# Patient Record
Sex: MALE | Race: WHITE
[De-identification: names, ages, dates, MRNs, and addresses within clinical notes are randomized per-mention and may not be internally consistent; named-entity substitution may affect disease eponyms.]

---

## 2017-07-11 ENCOUNTER — HOSPITAL ENCOUNTER (OUTPATIENT)
Dept: HOSPITAL 47 - ORWHC2ENDO | Age: 57
Discharge: HOME | End: 2017-07-11
Attending: SURGERY
Payer: COMMERCIAL

## 2017-07-11 VITALS — HEART RATE: 71 BPM | DIASTOLIC BLOOD PRESSURE: 57 MMHG | SYSTOLIC BLOOD PRESSURE: 114 MMHG | RESPIRATION RATE: 18 BRPM

## 2017-07-11 VITALS — BODY MASS INDEX: 31.1 KG/M2

## 2017-07-11 VITALS — TEMPERATURE: 97.5 F

## 2017-07-11 DIAGNOSIS — Z79.899: ICD-10-CM

## 2017-07-11 DIAGNOSIS — E11.9: ICD-10-CM

## 2017-07-11 DIAGNOSIS — I10: ICD-10-CM

## 2017-07-11 DIAGNOSIS — Z95.1: ICD-10-CM

## 2017-07-11 DIAGNOSIS — E78.5: ICD-10-CM

## 2017-07-11 DIAGNOSIS — Z12.11: Primary | ICD-10-CM

## 2017-07-11 DIAGNOSIS — Z79.4: ICD-10-CM

## 2017-07-11 DIAGNOSIS — K21.9: ICD-10-CM

## 2017-07-11 DIAGNOSIS — Z87.891: ICD-10-CM

## 2017-07-11 DIAGNOSIS — Z79.82: ICD-10-CM

## 2017-07-11 DIAGNOSIS — Z79.84: ICD-10-CM

## 2017-07-11 DIAGNOSIS — Z95.810: ICD-10-CM

## 2017-07-11 LAB
GLUCOSE BLD-MCNC: 113 MG/DL (ref 75–99)
GLUCOSE BLD-MCNC: 122 MG/DL (ref 75–99)

## 2017-07-11 NOTE — P.GSHP
History of Present Illness


H&P Date: 07/11/17


Chief Complaint: Screening colonoscopy





Assistant 56-year-old male referred from Dr. Luong.  Patient presents today 

for screening colonoscopy.





Past Medical History


Past Medical History: Diabetes Mellitus, GERD/Reflux, Hyperlipidemia, 

Hypertension


Additional Past Medical History / Comment(s): SEE DR CARSON'S H&P


History of Any Multi-Drug Resistant Organisms: None Reported


Past Surgical History: AICD, Appendectomy, Back Surgery, Coronary Bypass/CABG, 

Heart Catheterization


Additional Past Surgical History / Comment(s): CABG X5,.  LEFT EYE RETINAL SX 

X2.  RT CATARACT SX


Past Anesthesia/Blood Transfusion Reactions: Motion Sickness, Postoperative 

Nausea & Vomiting (PONV)


Type of Cardiac Device: AICD


Device Placement Date:: NOT SURE


Smoking Status: Former smoker





- Past Family History


  ** Father


Family Medical History: Cancer


Additional Family Medical History / Comment(s): PROSTATE





Medications and Allergies


 Home Medications











 Medication  Instructions  Recorded  Confirmed  Type


 


Aspirin [Adult Low Dose Aspirin EC] 81 mg PO QAM 05/31/16 07/10/17 History


 


Insulin Glargine [Lantus] 24 unit SQ  05/31/16 07/10/17 History


 


Metoprolol Tartrate [Lopressor] 50 mg PO BID 05/31/16 07/10/17 History


 


Omeprazole 20 mg PO QAM 05/31/16 07/10/17 History


 


prednisoLONE ACETATE 1% OPHTH 1 drop RIGHT EYE BID 05/31/16 07/10/17 History





[Pred Forte 1%]    


 


sitaGLIPtin [Januvia] 100 mg PO QAM 05/31/16 07/10/17 History


 


Atorvastatin [Lipitor] 80 mg PO  07/10/17 07/10/17 History


 


Losartan Potassium [Cozaar] 100 mg PO DAILY 07/10/17 07/10/17 History


 


metFORMIN HCL 1,000 mg PO BID 07/10/17 07/10/17 History











 Allergies











Allergy/AdvReac Type Severity Reaction Status Date / Time


 


No Known Allergies Allergy   Verified 07/10/17 08:15














Surgical - Exam


 Vital Signs











Pulse Resp BP Pulse Ox


 


 78   18   153/85   98 


 


 07/11/17 07:15  07/11/17 07:15  07/11/17 07:15  07/11/17 07:15














- General


well developed, no distress





- Eyes


PERRL





- Respiratory


normal expansion





- Cardiovascular


Rhythm: regular





- Abdomen


Abdomen: soft, non tender





Results





- Labs


 Abnormal Lab Results - Last 24 Hours (Table)











  07/11/17 Range/Units





  07:26 


 


POC Glucose (mg/dL)  122 H  (75-99)  mg/dL














Assessment and Plan


Plan: 





We'll perform screening colonoscopy.

## 2017-07-11 NOTE — P.OP
Date of Procedure: 07/11/17


Preoperative Diagnosis: 


Screening colonoscopy


Postoperative Diagnosis: 


Normal colonoscopy


Procedure(s) Performed: 


Colonoscopy


Implants: 





Anesthesia: MAC


Surgeon: Yadiel Argueta


Pathology: none sent


Condition: stable


Disposition: PACU


Indications for Procedure: 





Operative Findings: 





Description of Procedure: 





PROCEDURE:  The patient was placed on the endoscopy table in the lateral 

position.  Digital rectal examination was performed which revealed no 

abnormalities.  The prostate was symmetrical without nodules.  Flexible 

colonoscope was then placed in the patient's anus and passed throughout the 

entire colon.  The ileocecal valve was visualized.  The cecum, ascending, 

transverse, descending and sigmoid colon were normal.  The rectum was normal as 

well.  There were no masses, polyps or diverticula noted in the entire colon. 





SUMMARY OF FINDINGS:  


Normal colonoscopy.

## 2018-08-21 ENCOUNTER — HOSPITAL ENCOUNTER (EMERGENCY)
Dept: HOSPITAL 47 - EC | Age: 58
Discharge: HOME | End: 2018-08-21
Payer: COMMERCIAL

## 2018-08-21 VITALS — TEMPERATURE: 98.6 F | RESPIRATION RATE: 18 BRPM

## 2018-08-21 VITALS — DIASTOLIC BLOOD PRESSURE: 79 MMHG | SYSTOLIC BLOOD PRESSURE: 136 MMHG | HEART RATE: 80 BPM

## 2018-08-21 DIAGNOSIS — E78.5: ICD-10-CM

## 2018-08-21 DIAGNOSIS — K21.9: ICD-10-CM

## 2018-08-21 DIAGNOSIS — Z90.49: ICD-10-CM

## 2018-08-21 DIAGNOSIS — Z79.899: ICD-10-CM

## 2018-08-21 DIAGNOSIS — Z87.891: ICD-10-CM

## 2018-08-21 DIAGNOSIS — E86.0: ICD-10-CM

## 2018-08-21 DIAGNOSIS — D72.829: ICD-10-CM

## 2018-08-21 DIAGNOSIS — I10: ICD-10-CM

## 2018-08-21 DIAGNOSIS — Z79.52: ICD-10-CM

## 2018-08-21 DIAGNOSIS — Z79.82: ICD-10-CM

## 2018-08-21 DIAGNOSIS — R19.7: ICD-10-CM

## 2018-08-21 DIAGNOSIS — I25.10: ICD-10-CM

## 2018-08-21 DIAGNOSIS — I25.2: ICD-10-CM

## 2018-08-21 DIAGNOSIS — Z95.1: ICD-10-CM

## 2018-08-21 DIAGNOSIS — Z79.4: ICD-10-CM

## 2018-08-21 DIAGNOSIS — E11.9: ICD-10-CM

## 2018-08-21 DIAGNOSIS — E87.5: Primary | ICD-10-CM

## 2018-08-21 LAB
ALBUMIN SERPL-MCNC: 3.9 G/DL (ref 3.5–5)
ALP SERPL-CCNC: 97 U/L (ref 38–126)
ALT SERPL-CCNC: 48 U/L (ref 21–72)
AMYLASE SERPL-CCNC: 77 U/L (ref 30–110)
ANION GAP SERPL CALC-SCNC: 10 MMOL/L
AST SERPL-CCNC: 35 U/L (ref 17–59)
BASOPHILS # BLD AUTO: 0.1 K/UL (ref 0–0.2)
BASOPHILS NFR BLD AUTO: 1 %
BUN SERPL-SCNC: 31 MG/DL (ref 9–20)
CALCIUM SPEC-MCNC: 8.6 MG/DL (ref 8.4–10.2)
CHLORIDE SERPL-SCNC: 107 MMOL/L (ref 98–107)
CO2 SERPL-SCNC: 20 MMOL/L (ref 22–30)
EOSINOPHIL # BLD AUTO: 0.7 K/UL (ref 0–0.7)
EOSINOPHIL NFR BLD AUTO: 6 %
ERYTHROCYTE [DISTWIDTH] IN BLOOD BY AUTOMATED COUNT: 4.46 M/UL (ref 4.3–5.9)
ERYTHROCYTE [DISTWIDTH] IN BLOOD: 14 % (ref 11.5–15.5)
GLUCOSE SERPL-MCNC: 130 MG/DL (ref 74–99)
HCT VFR BLD AUTO: 40.7 % (ref 39–53)
HGB BLD-MCNC: 12.8 GM/DL (ref 13–17.5)
HYALINE CASTS UR QL AUTO: 21 /LPF (ref 0–2)
LIPASE SERPL-CCNC: 190 U/L (ref 23–300)
LYMPHOCYTES # SPEC AUTO: 1.4 K/UL (ref 1–4.8)
LYMPHOCYTES NFR SPEC AUTO: 12 %
MCH RBC QN AUTO: 28.7 PG (ref 25–35)
MCHC RBC AUTO-ENTMCNC: 31.4 G/DL (ref 31–37)
MCV RBC AUTO: 91.4 FL (ref 80–100)
MONOCYTES # BLD AUTO: 0.8 K/UL (ref 0–1)
MONOCYTES NFR BLD AUTO: 7 %
NEUTROPHILS # BLD AUTO: 8.4 K/UL (ref 1.3–7.7)
NEUTROPHILS NFR BLD AUTO: 72 %
PH UR: 5 [PH] (ref 5–8)
PLATELET # BLD AUTO: 332 K/UL (ref 150–450)
POTASSIUM SERPL-SCNC: 5.5 MMOL/L (ref 3.5–5.1)
PROT SERPL-MCNC: 6.8 G/DL (ref 6.3–8.2)
PROT UR QL: (no result)
RBC UR QL: <1 /HPF (ref 0–5)
SODIUM SERPL-SCNC: 137 MMOL/L (ref 137–145)
SP GR UR: 1.01 (ref 1–1.03)
SQUAMOUS UR QL AUTO: 3 /HPF (ref 0–4)
UROBILINOGEN UR QL STRIP: <2 MG/DL (ref ?–2)
WBC # BLD AUTO: 11.7 K/UL (ref 3.8–10.6)
WBC #/AREA URNS HPF: 5 /HPF (ref 0–5)

## 2018-08-21 PROCEDURE — 80053 COMPREHEN METABOLIC PANEL: CPT

## 2018-08-21 PROCEDURE — 83690 ASSAY OF LIPASE: CPT

## 2018-08-21 PROCEDURE — 99284 EMERGENCY DEPT VISIT MOD MDM: CPT

## 2018-08-21 PROCEDURE — 74018 RADEX ABDOMEN 1 VIEW: CPT

## 2018-08-21 PROCEDURE — 93005 ELECTROCARDIOGRAM TRACING: CPT

## 2018-08-21 PROCEDURE — 96361 HYDRATE IV INFUSION ADD-ON: CPT

## 2018-08-21 PROCEDURE — 85025 COMPLETE CBC W/AUTO DIFF WBC: CPT

## 2018-08-21 PROCEDURE — 36415 COLL VENOUS BLD VENIPUNCTURE: CPT

## 2018-08-21 PROCEDURE — 96375 TX/PRO/DX INJ NEW DRUG ADDON: CPT

## 2018-08-21 PROCEDURE — 82150 ASSAY OF AMYLASE: CPT

## 2018-08-21 PROCEDURE — 96365 THER/PROPH/DIAG IV INF INIT: CPT

## 2018-08-21 PROCEDURE — 81001 URINALYSIS AUTO W/SCOPE: CPT

## 2018-08-21 NOTE — XR
EXAMINATION TYPE: XR KUB, 2 views

 

DATE OF EXAM: 8/21/2018

 

COMPARISON: NONE

 

HISTORY: Abnormal bowel movements; Diarrhea for 2 months, history appendectomy.

 

TECHNIQUE: 2 upright views

 

FINDINGS: 

Cardiac pacemaker and sternal sutures and mediastinal clips noted. Visualized lung bases and pleural 
spaces are negative.

 

There is no pneumoperitoneum. The bowel gas pattern is normal. The soft tissues and skeletal structur
es are negative for acute findings. 

 

IMPRESSION: No acute radiographic process.

## 2018-08-21 NOTE — ED
General Adult HPI





- General


Chief complaint: Nausea/Vomiting/Diarrhea


Stated complaint: Diarrhea/kidney pain


Time Seen by Provider: 08/21/18 16:19


Source: patient


Mode of arrival: ambulatory


Limitations: no limitations





- History of Present Illness


Initial comments: 


This a 57-year-old male past medical history of coronary artery disease with 

previous MI and CABG, diabetes, hypertension who presents today for chief 

complaint of diarrhea 2 months.  Patient states that 2 months ago he began 

having liquidy diarrhea on and off, he states is not daily times a week.  

Patient denies any sick contacts at that time, or anyone else having diarrhea 

home.  He did admit to eating some [employer] for which she thought had triggered 

it originally.  However when the diarrhea continued for 2 months thought it was 

something different, he saw his PCP for diarrhea, . 6 days ago pt 

was started on ciprofloxacin for diarrhea however, he stated that for the 5-6 

days the diarrhea is with every bowel movement and he has had up to 6 loose 

bowel movements for one day. Patient denies any blood in his stool or dark 

stools.  Patient's last colonoscopy was one year ago which he stated it was 

within normal limits and he was told to come back in 10 years for repeat.  

Patient has a currently scheduled upper endoscopy on September 12 from primary 

care provider as well as a CT of the abdomen and pelvis with contrast. Upon 

arrival patient denies any abdominal pain, chest pain, shortness breath, fever, 

chills, nausea, vomiting , dysuria, urgency, frequency, oliguria, muscle 

weakness, palpitations.   








- Related Data


 Home Medications











 Medication  Instructions  Recorded  Confirmed


 


Aspirin [Adult Low Dose Aspirin EC] 81 mg PO QAM 05/31/16 08/21/18


 


Metoprolol Tartrate [Lopressor] 50 mg PO BID 05/31/16 08/21/18


 


Omeprazole 20 mg PO QAM 05/31/16 08/21/18


 


prednisoLONE ACETATE 1% OPHTH 1 drop RIGHT EYE DAILY 05/31/16 08/21/18





[Pred Forte 1%]   


 


sitaGLIPtin [Januvia] 100 mg PO QAM 05/31/16 08/21/18


 


Atorvastatin [Lipitor] 80 mg PO HS 07/10/17 08/21/18


 


Losartan Potassium [Cozaar] 100 mg PO DAILY 07/10/17 08/21/18


 


metFORMIN HCL 1,000 mg PO BID 07/10/17 08/21/18


 


Ciprofloxacin HCl [Cipro] 500 mg PO BID 08/21/18 08/21/18


 


Insulin Glargine,Hum.rec.anlog 24 unit SQ HS 08/21/18 08/21/18





[Basaglar Kwikpen U-100]   











 Allergies











Allergy/AdvReac Type Severity Reaction Status Date / Time


 


No Known Allergies Allergy   Verified 08/21/18 16:05














Review of Systems


ROS Statement: 


Those systems with pertinent positive or pertinent negative responses have been 

documented in the HPI.





ROS Other: All systems not noted in ROS Statement are negative.


Constitutional: Denies: fever, chills, weakness, weight change


Respiratory: Denies: cough, dyspnea, wheezes, hemoptysis, stridor


Cardiovascular: Denies: chest pain, palpitations, dyspnea on exertion, edema


Endocrine: Denies: fatigue


Gastrointestinal: Reports: diarrhea.  Denies: abdominal pain, nausea, vomiting, 

constipation, hematemesis, melena, hematochezia


Genitourinary: Denies: urgency, dysuria, frequency, hematuria, discharge


Musculoskeletal: Denies: back pain


Skin: Denies: rash, lesions


Neurological: Denies: headache, weakness, numbness, paresthesias, confusion, 

abnormal gait, vertigo





Past Medical History


Past Medical History: Diabetes Mellitus, GERD/Reflux, Hyperlipidemia, 

Hypertension


Additional Past Medical History / Comment(s): SEE DR CARSON'S H&P


History of Any Multi-Drug Resistant Organisms: None Reported


Past Surgical History: AICD, Appendectomy, Back Surgery, Coronary Bypass/CABG, 

Heart Catheterization


Additional Past Surgical History / Comment(s): CABG X5,.  LEFT EYE RETINAL SX 

X2.  RT CATARACT SX


Past Anesthesia/Blood Transfusion Reactions: Motion Sickness, Postoperative 

Nausea & Vomiting (PONV)


Type of Cardiac Device: AICD


Device Placement Date:: NOT SURE


Past Psychological History: No Psychological Hx Reported


Smoking Status: Former smoker


Past Alcohol Use History: None Reported


Past Drug Use History: None Reported





- Past Family History


  ** Father


Family Medical History: Cancer


Additional Family Medical History / Comment(s): PROSTATE





General Exam





- General Exam Comments


Initial Comments: 


General:  The patient is awake and alert, in no distress, and does not appear 

acutely ill. 


Eye:  Pupils are equal, round and reactive to light, extra-ocular movements are 

intact.  No nystagmus.  There is normal conjunctiva bilaterally.  No signs of 

icterus.  


Ears, nose, mouth and throat:  There are moist mucous membranes and no oral 

lesions. 


Neck:  The neck is supple, there is no tenderness or JVD.  


Cardiovascular:  There is a regular rate and rhythm. No murmur, rub or gallop 

is appreciated.


Respiratory:  Lungs are clear to auscultation, respirations are non-labored, 

breath sounds are equal.  No wheezes, stridor, rales, or rhonchi.


Gastrointestinal:  Soft, non-distended, non-tender abdomen without masses or 

organomegaly noted. There is no rebound or guarding present.  No CVA 

tenderness. Bowel sounds are unremarkable.(-) Psoas, obturator


Musculoskeletal:  Normal ROM, no tenderness.  Strength 5/5. Sensation intact. 

Pulses equal bilaterally 2+.  


Neurological:  A&O x 3. CN II-XII intact, There are no obvious motor or sensory 

deficits. Coordination appears grossly intact. Speech is normal.


Skin:  Skin is warm and dry and no rashes or lesions are noted. 


Psychiatric:  Cooperative, appropriate mood & affect, normal judgment.  





Limitations: no limitations





Course


 Vital Signs











  08/21/18 08/21/18 08/21/18





  14:24 18:11 19:13


 


Temperature 98.2 F  98.6 F


 


Pulse Rate 93 89 81


 


Respiratory 18 16 18





Rate   


 


Blood Pressure 120/77 119/75 133/76


 


O2 Sat by Pulse 99 98 98





Oximetry   














EKG Findings





- EKG Comments:


EKG Findings:: Normal sinus rhythm ventricular rate 85bpm, NM interval 186 ms, 

QRS duration 94 ms,  ms.  No evidence of peaked T waves.





Medical Decision Making





- Medical Decision Making


58yo with cc of diarrhea x2 months. CBC, CMP, UA,  KUB, and Guiac obtained. KUB 

WNL no evidence of toxic lisa colon. Labs revealed mildly elevated WBC 11.7, 

Hgb 12.8. Pt K elevated at 5.5. BUN 31 and Cr 1.80, pt baseline is 1.20 upon 

medical records review. Pt received another bolus of normal saline, 1000 

milligrams calcium chloride, 10 units of insulin regular IV once with dextrose 

50%-waterwater syringe for increased K+. Pt denied chest pain, shortness of 

breath EKG obtained revealed no peak waves, sine sign, increased NM interval or 

loss of P wave. No ST elevation or T wave inversion. EKG reviewed by myself and 

Dr. Lerner. At this time we feel pt lab abnormalities are consistent with hx 

of 2 months of diarrhea and are most likely do to dehydration from volume loss. 

Ketone (-) on UA. Guiac (-). Pt VS stable, afebrile. pt is requesting d/c. Dr. Lerner and myself feel pt is stable for d/c with GI and PCP f/u, with repeat 

labs from PCP. Pt was told to continue to drink fluids as he states he has been 

and was educated on the brat diet. Pt was unable to give stool sample during 

stay without any episodes of diarrhea. He was given RX for stool dx, stool WBC 

and stool c. difficile PCR. Pt was instructed to bring sample to the ER when 

collected. Pt understood plan and was d/c in stable condition.








- Lab Data


Result diagrams: 


 08/21/18 15:35





 08/21/18 15:35


 Lab Results











  08/21/18 08/21/18 08/21/18 Range/Units





  15:35 15:35 17:26 


 


WBC   11.7 H   (3.8-10.6)  k/uL


 


RBC   4.46   (4.30-5.90)  m/uL


 


Hgb   12.8 L   (13.0-17.5)  gm/dL


 


Hct   40.7   (39.0-53.0)  %


 


MCV   91.4   (80.0-100.0)  fL


 


MCH   28.7   (25.0-35.0)  pg


 


MCHC   31.4   (31.0-37.0)  g/dL


 


RDW   14.0   (11.5-15.5)  %


 


Plt Count   332   (150-450)  k/uL


 


Neutrophils %   72   %


 


Lymphocytes %   12   %


 


Monocytes %   7   %


 


Eosinophils %   6   %


 


Basophils %   1   %


 


Neutrophils #   8.4 H   (1.3-7.7)  k/uL


 


Lymphocytes #   1.4   (1.0-4.8)  k/uL


 


Monocytes #   0.8   (0-1.0)  k/uL


 


Eosinophils #   0.7   (0-0.7)  k/uL


 


Basophils #   0.1   (0-0.2)  k/uL


 


Hypochromasia   Slight   


 


Sodium  137    (137-145)  mmol/L


 


Potassium  5.5 H    (3.5-5.1)  mmol/L


 


Chloride  107    ()  mmol/L


 


Carbon Dioxide  20 L    (22-30)  mmol/L


 


Anion Gap  10    mmol/L


 


BUN  31 H    (9-20)  mg/dL


 


Creatinine  1.80 H    (0.66-1.25)  mg/dL


 


Est GFR (CKD-EPI)AfAm  47    (>60 ml/min/1.73 sqM)  


 


Est GFR (CKD-EPI)NonAf  41    (>60 ml/min/1.73 sqM)  


 


Glucose  130 H    (74-99)  mg/dL


 


Calcium  8.6    (8.4-10.2)  mg/dL


 


Total Bilirubin  0.5    (0.2-1.3)  mg/dL


 


AST  35    (17-59)  U/L


 


ALT  48    (21-72)  U/L


 


Alkaline Phosphatase  97    ()  U/L


 


Total Protein  6.8    (6.3-8.2)  g/dL


 


Albumin  3.9    (3.5-5.0)  g/dL


 


Amylase  77    ()  U/L


 


Lipase  190    ()  U/L


 


Urine Color    Yellow  


 


Urine Appearance    Clear  (Clear)  


 


Urine pH    5.0  (5.0-8.0)  


 


Ur Specific Gravity    1.013  (1.001-1.035)  


 


Urine Protein    1+ H  (Negative)  


 


Urine Glucose (UA)    Negative  (Negative)  


 


Urine Ketones    Negative  (Negative)  


 


Urine Blood    Negative  (Negative)  


 


Urine Nitrite    Negative  (Negative)  


 


Urine Bilirubin    Negative  (Negative)  


 


Urine Urobilinogen    <2.0  (<2.0)  mg/dL


 


Ur Leukocyte Esterase    Negative  (Negative)  


 


Urine RBC    <1  (0-5)  /hpf


 


Urine WBC    5  (0-5)  /hpf


 


Ur Squamous Epith Cells    3  (0-4)  /hpf


 


Amorphous Sediment    Rare H  (None)  /hpf


 


Hyaline Casts    21 H  (0-2)  /lpf


 


Urine Mucus    Rare H  (None)  /hpf














Disposition


Clinical Impression: 


 Dehydration, Diarrhea, Blood creatinine increased compared with prior 

measurement, Hyperkalemia





Disposition: HOME SELF-CARE


Condition: Good


Instructions:  Acute Diarrhea (ED)


Additional Instructions: 


Please use medication as discussed.  Please follow-up with family doctor in the 

next 2 days for repeat laboratory values including potassium and creatinine. 

Please return with stool culture for testing as discussed. Please follow-up 

with GI specialist as discussed.  Please return to emergency room if the 

symptoms increase or worsen or for any other concerns as discussed.


Is patient prescribed a controlled substance at d/c from ED?: No


Referrals: 


Manuel Goodrich DO [Primary Care Provider] - 1-2 days


Lory Holliday MD [STAFF PHYSICIAN] - 1-2 days


Time of Disposition: 18:44

## 2018-08-29 ENCOUNTER — HOSPITAL ENCOUNTER (OUTPATIENT)
Dept: HOSPITAL 47 - RADCTMAIN | Age: 58
Discharge: HOME | End: 2018-08-29
Attending: FAMILY MEDICINE
Payer: COMMERCIAL

## 2018-08-29 DIAGNOSIS — N32.89: ICD-10-CM

## 2018-08-29 DIAGNOSIS — N20.0: Primary | ICD-10-CM

## 2018-08-29 LAB — BUN SERPL-SCNC: 24 MG/DL (ref 9–20)

## 2018-08-29 PROCEDURE — 74176 CT ABD & PELVIS W/O CONTRAST: CPT

## 2018-08-29 PROCEDURE — 36415 COLL VENOUS BLD VENIPUNCTURE: CPT

## 2018-08-29 PROCEDURE — 82565 ASSAY OF CREATININE: CPT

## 2018-08-29 PROCEDURE — 84520 ASSAY OF UREA NITROGEN: CPT

## 2018-08-29 NOTE — CT
EXAMINATION TYPE: CT abdomen pelvis wo con

 

DATE OF EXAM: 8/29/2018

 

COMPARISON:

 

HISTORY: Generalized pain with diarrhea

 

CT DLP: 1206 mGycm

 

Examination of the solid and hollow viscera is limited given the lack of contrast.

 

FINDINGS: 

 

LUNG BASES: No evidence for nodule. No evidence for infiltrate.

 

LIVER/GB: Small layering gallstones noted. No space-occupying hepatic lesion.

 

PANCREAS: No pancreatic mass identified. No inflammatory process seen.

 

SPLEEN: No evidence for splenomegaly. No intrasplenic lesions seen.

 

ADRENALS: No adrenal nodules identified. No evidence for thickening.

 

KIDNEYS: Probable extrarenal pelvis on the left. Nonobstructing 2 mm renal calculus on the left. No a
dditional calculi seen. No distinct renal mass. Mild wall thickening urinary bladder may reflect cyst
itis.

 

BOWEL: Nonvisualization of the appendix. Mild scattered fecal stasis. No evidence of bowel obstructio
n. No inflammatory process.

 

Lymph nodes: No evidence for adenopathy greater than 1 cm.

 

Abdominal aorta: Atheromatous changes seen. No evidence for aneurysm.

 

Genital organs: No significant abnormality.

 

Other: No significant abnormality.

 

IMPRESSION: 

1. Urinary bladder wall thickening mild in degree may reflect cystitis. Correlate clinically.

2. Layering gallstones or sludge within the gallbladder.

3. Nonobstructing left-sided nephrolithiasis.

## 2018-09-12 ENCOUNTER — HOSPITAL ENCOUNTER (OUTPATIENT)
Dept: HOSPITAL 47 - ORWHC2ENDO | Age: 58
Discharge: HOME | End: 2018-09-12
Attending: SURGERY
Payer: COMMERCIAL

## 2018-09-12 VITALS — TEMPERATURE: 97.9 F | RESPIRATION RATE: 16 BRPM

## 2018-09-12 VITALS — DIASTOLIC BLOOD PRESSURE: 80 MMHG | SYSTOLIC BLOOD PRESSURE: 145 MMHG | HEART RATE: 90 BPM

## 2018-09-12 VITALS — BODY MASS INDEX: 30.4 KG/M2

## 2018-09-12 DIAGNOSIS — Z79.82: ICD-10-CM

## 2018-09-12 DIAGNOSIS — Z79.4: ICD-10-CM

## 2018-09-12 DIAGNOSIS — Z95.1: ICD-10-CM

## 2018-09-12 DIAGNOSIS — H40.9: ICD-10-CM

## 2018-09-12 DIAGNOSIS — I10: ICD-10-CM

## 2018-09-12 DIAGNOSIS — I25.10: ICD-10-CM

## 2018-09-12 DIAGNOSIS — Z87.891: ICD-10-CM

## 2018-09-12 DIAGNOSIS — Z79.899: ICD-10-CM

## 2018-09-12 DIAGNOSIS — E11.9: ICD-10-CM

## 2018-09-12 DIAGNOSIS — E78.5: ICD-10-CM

## 2018-09-12 DIAGNOSIS — K29.70: Primary | ICD-10-CM

## 2018-09-12 DIAGNOSIS — Z95.810: ICD-10-CM

## 2018-09-12 DIAGNOSIS — K21.9: ICD-10-CM

## 2018-09-12 LAB — GLUCOSE BLD-MCNC: 108 MG/DL (ref 75–99)

## 2018-09-12 PROCEDURE — 43239 EGD BIOPSY SINGLE/MULTIPLE: CPT

## 2018-09-12 PROCEDURE — 88305 TISSUE EXAM BY PATHOLOGIST: CPT

## 2018-09-12 NOTE — P.OP
Date of Procedure: 09/12/18


Preoperative Diagnosis: 


Peptic ulcer disease


Postoperative Diagnosis: 


Antral gastritis


Procedure(s) Performed: 


EGD


Anesthesia: MAC


Surgeon: Yadiel Argueta


Pathology: other (Antrum)


Condition: stable


Disposition: PACU


Description of Procedure: 


The patient's placed on the endoscopy table in the lateral position.  He 

received IV sedation.  The gastroscope placed oropharynx and passed in the 

esophagus and the stomach.  Scope was then placed through the pylorus.  The 

first and second portion of the duodenum appeared normal.  Scope was then 

brought back the antrum this.  Mildly inflamed.  A biopsy was performed.  The 

scope was then brought back the stomach and there was a large amount of 

retained food in the stomach that this limited the view of the mucosa stomach.  

The scope was retroflexed no significant hiatal hernia was seen.  Once again 

there was a large amount of food in the fundus of the stomach.  The GE junction 

was at 38 cm.  The distal esophagus appeared normal.  The proximal esophagus 

appeared normal.  Scope was withdrawn for patient.

## 2018-09-12 NOTE — P.GSHP
History of Present Illness


H&P Date: 09/12/18


Chief Complaint: Peptic ulcer disease, epigastric pain





This a 57-year-old male referred from Dr. Luong.  Patient rents today for EGD.

  He's had issues with some epigastric pain.  Patient is today for EGD for 

evaluation possible peptic ulcer disease.  His recent CAT scan shows evidence 

of cholelithiasis.





Past Medical History


Past Medical History: Diabetes Mellitus, GERD/Reflux, Hyperlipidemia, 

Hypertension


Additional Past Medical History / Comment(s): SEE DR CARSON'S H&P


History of Any Multi-Drug Resistant Organisms: None Reported


Past Surgical History: AICD, Appendectomy, Back Surgery, Coronary Bypass/CABG, 

Heart Catheterization


Additional Past Surgical History / Comment(s): CABG X5,.  LEFT EYE RETINAL SX 

X2.  RT CATARACT SX.  GLAUCOMA SX RT EYE.  COLONOSCOPY


Past Anesthesia/Blood Transfusion Reactions: Motion Sickness, Postoperative 

Nausea & Vomiting (PONV)


Type of Cardiac Device: AICD


Device Placement Date:: 6/2016


Smoking Status: Former smoker





- Past Family History


  ** Father


Family Medical History: Cancer


Additional Family Medical History / Comment(s): PROSTATE





Medications and Allergies


 Home Medications











 Medication  Instructions  Recorded  Confirmed  Type


 


Aspirin [Adult Low Dose Aspirin EC] 81 mg PO QAM 05/31/16 09/10/18 History


 


Metoprolol Tartrate [Lopressor] 50 mg PO BID 05/31/16 09/12/18 History


 


Omeprazole 20 mg PO QAM 05/31/16 09/12/18 History


 


prednisoLONE ACETATE 1% OPHTH 1 drop RIGHT EYE DAILY 05/31/16 09/12/18 History





[Pred Forte 1%]    


 


sitaGLIPtin [Januvia] 100 mg PO QAM 05/31/16 09/12/18 History


 


Atorvastatin [Lipitor] 80 mg PO HS 07/10/17 09/12/18 History


 


Losartan Potassium [Cozaar] 100 mg PO DAILY 07/10/17 09/12/18 History


 


metFORMIN HCL 1,000 mg PO BID 07/10/17 09/12/18 History


 


Insulin Glargine,Hum.rec.anlog 24 unit SQ  08/21/18 09/12/18 History





[Basaglar Kwikpen U-100]    


 


Metoprolol Tartrate [Lopressor] 25 mg PO AC-LUNCH 09/10/18 09/12/18 History











 Allergies











Allergy/AdvReac Type Severity Reaction Status Date / Time


 


No Known Allergies Allergy   Verified 09/12/18 08:50














Surgical - Exam


 Vital Signs











Temp Pulse Resp BP Pulse Ox


 


 97.9 F   97   16   124/62   100 


 


 09/12/18 08:55  09/12/18 08:55  09/12/18 08:55  09/12/18 08:55  09/12/18 08:55














- General


well developed, no distress





- Eyes


PERRL





- ENT


normal pinna





- Neck


no masses





- Respiratory


normal expansion





- Cardiovascular


Rhythm: regular





- Abdomen





Mild epigastric tenderness


Abdomen: soft





Assessment and Plan


Assessment: 





Epigastric pain, peptic ulcer disease.  We'll perform EGD.

## 2018-09-19 ENCOUNTER — HOSPITAL ENCOUNTER (OUTPATIENT)
Dept: HOSPITAL 47 - RADNMMAIN | Age: 58
End: 2018-09-19
Attending: FAMILY MEDICINE
Payer: COMMERCIAL

## 2018-09-19 DIAGNOSIS — R68.89: ICD-10-CM

## 2018-09-19 DIAGNOSIS — R10.84: Primary | ICD-10-CM

## 2018-09-19 DIAGNOSIS — R94.5: ICD-10-CM

## 2018-09-19 PROCEDURE — 78227 HEPATOBIL SYST IMAGE W/DRUG: CPT

## 2018-09-19 NOTE — NM
EXAMINATION TYPE: NM hepatobiliary w CCK

 

DATE OF EXAM: 9/19/2018

 

COMPARISON: NONE

 

INDICATION: Abdominal pain

 

TECHNIQUE: After the intravenous administration of 5.11 mCi Tc 99m Mebrofenin hepatobiliary scintigra
phy is performed.  Images were obtained immediately post injection.

 

FINDINGS: 

There is prompt uptake and excretion of radiotracer by the liver.

Extrahepatic ducts are identified at 5 minutes.  

The gallbladder is visualized within 20 minutes.  

Small bowel activity is noted within 8 minutes.  

 

At one hour CCK was administered, patient was injected with 2.0 mcg of Kinevac, and gallbladder eject
ion fraction is calculated at 45 %, which is in the normal range..  (Normal >35% and <80%.).

 

IMPRESSION:

1.  Normal hepatobiliary scan.

## 2018-09-24 ENCOUNTER — HOSPITAL ENCOUNTER (OUTPATIENT)
Dept: HOSPITAL 47 - LABWHC1 | Age: 58
Discharge: HOME | End: 2018-09-24
Attending: INTERNAL MEDICINE
Payer: COMMERCIAL

## 2018-09-24 DIAGNOSIS — K58.9: Primary | ICD-10-CM

## 2018-09-24 LAB — GLIADIN IGA SER-ACNC: 1.6 U/ML

## 2018-09-24 PROCEDURE — 36415 COLL VENOUS BLD VENIPUNCTURE: CPT

## 2018-09-24 PROCEDURE — 83516 IMMUNOASSAY NONANTIBODY: CPT

## 2018-09-28 ENCOUNTER — HOSPITAL ENCOUNTER (OUTPATIENT)
Dept: HOSPITAL 47 - OR | Age: 58
Discharge: HOME | End: 2018-09-28
Attending: SURGERY
Payer: COMMERCIAL

## 2018-09-28 VITALS — BODY MASS INDEX: 30.4 KG/M2

## 2018-09-28 VITALS — DIASTOLIC BLOOD PRESSURE: 78 MMHG | SYSTOLIC BLOOD PRESSURE: 147 MMHG | HEART RATE: 67 BPM

## 2018-09-28 VITALS — TEMPERATURE: 98.5 F

## 2018-09-28 VITALS — RESPIRATION RATE: 18 BRPM

## 2018-09-28 DIAGNOSIS — H54.62: ICD-10-CM

## 2018-09-28 DIAGNOSIS — E11.319: ICD-10-CM

## 2018-09-28 DIAGNOSIS — Z95.810: ICD-10-CM

## 2018-09-28 DIAGNOSIS — E11.39: ICD-10-CM

## 2018-09-28 DIAGNOSIS — I10: ICD-10-CM

## 2018-09-28 DIAGNOSIS — Z87.891: ICD-10-CM

## 2018-09-28 DIAGNOSIS — K21.9: ICD-10-CM

## 2018-09-28 DIAGNOSIS — Z79.82: ICD-10-CM

## 2018-09-28 DIAGNOSIS — I25.10: ICD-10-CM

## 2018-09-28 DIAGNOSIS — H40.9: ICD-10-CM

## 2018-09-28 DIAGNOSIS — H42: ICD-10-CM

## 2018-09-28 DIAGNOSIS — F41.9: ICD-10-CM

## 2018-09-28 DIAGNOSIS — Z79.4: ICD-10-CM

## 2018-09-28 DIAGNOSIS — E78.5: ICD-10-CM

## 2018-09-28 DIAGNOSIS — K80.10: Primary | ICD-10-CM

## 2018-09-28 DIAGNOSIS — Z95.1: ICD-10-CM

## 2018-09-28 DIAGNOSIS — Z79.899: ICD-10-CM

## 2018-09-28 LAB
ANION GAP SERPL CALC-SCNC: 11 MMOL/L
BUN SERPL-SCNC: 19 MG/DL (ref 9–20)
CALCIUM SPEC-MCNC: 9.1 MG/DL (ref 8.4–10.2)
CHLORIDE SERPL-SCNC: 106 MMOL/L (ref 98–107)
CO2 SERPL-SCNC: 25 MMOL/L (ref 22–30)
GLUCOSE BLD-MCNC: 108 MG/DL (ref 75–99)
GLUCOSE BLD-MCNC: 160 MG/DL (ref 75–99)
GLUCOSE SERPL-MCNC: 109 MG/DL (ref 74–99)
POTASSIUM SERPL-SCNC: 4.6 MMOL/L (ref 3.5–5.1)
SODIUM SERPL-SCNC: 142 MMOL/L (ref 137–145)

## 2018-09-28 PROCEDURE — 47562 LAPAROSCOPIC CHOLECYSTECTOMY: CPT

## 2018-09-28 PROCEDURE — 80048 BASIC METABOLIC PNL TOTAL CA: CPT

## 2018-09-28 PROCEDURE — 88304 TISSUE EXAM BY PATHOLOGIST: CPT

## 2018-09-28 RX ADMIN — ONDANSETRON ONE MG: 2 INJECTION INTRAMUSCULAR; INTRAVENOUS at 14:22

## 2018-09-28 RX ADMIN — ONDANSETRON ONE MG: 2 INJECTION INTRAMUSCULAR; INTRAVENOUS at 10:35

## 2018-09-28 NOTE — P.GSHP
History of Present Illness


H&P Date: 09/28/18


Chief Complaint: Right upper quadrant pain





This a 57-year-old male referred from Dr. jones.  Patient presents today 

for laparoscopic cholecystectomy.  Patient has had complaints of right upper 

quadrant pain.  He was worked up found have evidence of cholelithiasis.





Past Medical History


Past Medical History: Coronary Artery Disease (CAD), Diabetes Mellitus, Eye 

Disorder, GERD/Reflux, Hyperlipidemia, Hypertension


Additional Past Medical History / Comment(s): AICD (SCVNGR 6/1/16).,

DIABETIC RETINOPATHY, BLIND LEFT EYE, CURRENTLY PT STATES INJECTION RIGHT EYE 

FOR HEMORRHAGING (9/25/18)- STATES RIGHT EYE HAS FLOATERS AND IS CLOUDY., 

FREQUENT DIARRHEA- FOLLOWING WITH DR. SAURABH STRICKLAND., STATES GALL STONES.


History of Any Multi-Drug Resistant Organisms: None Reported


Past Surgical History: AICD, Appendectomy, Back Surgery, Coronary Bypass/CABG, 

Heart Catheterization


Additional Past Surgical History / Comment(s): CABG X5 (2/25/2016)., AICD (

SCVNGR 6/1/18).,.  LEFT EYE RETINAL SX X2.  RT CATARACT SX AND 

GLAUCOMA SURGERY.


Past Anesthesia/Blood Transfusion Reactions: Motion Sickness, Postoperative 

Nausea & Vomiting (PONV)


Type of Cardiac Device: AICD


Device Placement Date:: 6/1/16


Past Psychological History: Anxiety


Additional Psychological History / Comment(s): STATES ANXIETY WITH HEALTH ISSUES

- NO RX.


Smoking Status: Former smoker


Past Alcohol Use History: None Reported


Additional Past Alcohol Use History / Comment(s): SMOKED CIGARS FROM 2007, QUIT 

01/2016


Past Drug Use History: None Reported





- Past Family History


  ** Father


Family Medical History: Cancer


Additional Family Medical History / Comment(s): PROSTATE





Medications and Allergies


 Home Medications











 Medication  Instructions  Recorded  Confirmed  Type


 


Aspirin [Adult Low Dose Aspirin EC] 81 mg PO QAM 05/31/16 09/26/18 History


 


Metoprolol Tartrate [Lopressor] 50 mg PO BID 05/31/16 09/26/18 History


 


Omeprazole 20 mg PO QAM 05/31/16 09/26/18 History


 


prednisoLONE ACETATE 1% OPHTH 1 drop RIGHT EYE HS 05/31/16 09/26/18 History





[Pred Forte 1%]    


 


sitaGLIPtin [Januvia] 100 mg PO QAM 05/31/16 09/26/18 History


 


Atorvastatin [Lipitor] 80 mg PO HS 07/10/17 09/26/18 History


 


Losartan Potassium [Cozaar] 100 mg PO DAILY 07/10/17 09/26/18 History


 


metFORMIN HCL 1,000 mg PO BID 07/10/17 09/26/18 History


 


Insulin Glargine,Hum.rec.anlog 24 unit SQ HS 08/21/18 09/26/18 History





[Basaglar Kwikpen U-100]    


 


Metoprolol Tartrate [Lopressor] 25 mg PO AC-LUNCH 09/10/18 09/26/18 History


 


Imodium (Unknown Dose) 1 tab PO AS DIRECTED PRN 09/26/18 09/28/18 History


 


Metamucil (Unknown Dose) 1 dose PO CONTINUOUS PRN 09/26/18 09/28/18 History











 Allergies











Allergy/AdvReac Type Severity Reaction Status Date / Time


 


No Known Allergies Allergy   Verified 09/28/18 10:09














Surgical - Exam


 Vital Signs











Temp Pulse Resp BP Pulse Ox


 


 97.1 F L  82   16   183/89   98 


 


 09/28/18 10:16  09/28/18 10:16  09/28/18 10:16  09/28/18 10:16  09/28/18 10:16














- General


well developed, no distress





- Eyes


PERRL





- ENT


normal pinna





- Neck


no masses





- Respiratory


normal expansion





- Cardiovascular


Rhythm: regular





- Abdomen





Mild right upper quadrant tenderness


Abdomen: soft





Assessment and Plan


Assessment: 





Cholelithiasis





Cholecystitis





We'll perform laparoscopic cholecystectomy

## 2018-09-28 NOTE — P.OP
Date of Procedure: 09/28/18


Preoperative Diagnosis: 


Cholelithiasis





Chronic cholecystitis


Postoperative Diagnosis: 


Cholecystitis





Cholelithiasis


Procedure(s) Performed: 


Laparoscopic cholecystectomy


Anesthesia: CHERELLE


Surgeon: Yadiel Argueta


Estimated Blood Loss (ml): 5


Pathology: other (Gallbladder)


Condition: stable


Disposition: PACU


Description of Procedure: 


The patient was placed on the operating table.   The patient received a general 

endotracheal tube anesthesia.    The patients abdomen was prepped and draped 

in the usual sterile fashion.    Through an infraumbilical stab incision, the 

fascia of the anterior abdominal wall was grasped with a pair of Kochers and 

then the Veress needle was placed in the peritoneal cavity.   Position of the 

Veress needle was confirmed with positive drop test.   The abdomen was then 

insufflated.  After adequate insufflation, the 10 mm trocar was placed in the 

peritoneal cavity.    Following this the laparoscope was placed in the 

peritoneal cavity. The patient was placed in the head-up, right side up 

position and then a 5 mm trocar was placed in the right lateral and right 

subcostal position under direct visualization.    A 8 mm trocar was placed in 

the epigastric position.  The gallbladder was grasped in the fundus and 

infundibulum.  Traction  on the gallbladder was placed in the lateral and the 

cephalad positions.  The triangle of Calot  was visualized..  The gallbladder 

was very scarred.  The cystic duct was bluntly dissected until the union of the 

cystic duct and common bile duct was seen.    The cystic duct was then divided 

and sealed with the Harmonic scissors.  A PDS Endoloop was then placed 

throughout the cystic duct stump.  The cystic artery divided and sealed with 

the Harmonic scissors.   The gallbladder was then removed from the liver bed 

using Harmonic scissors.   The gallbladder was then extracted through the 

epigastric port site.  Operative field was checked for any bleeding spots and 

Harmonic scissors was used to coagulate the liver bed.    The abdomen was 

irrigated.   The trocars were removed.  The skin was closed using interrupted 3-

0 Vicryl suture.   Dermabond dressing were applied.   The patient tolerated the 

procedure well.

## 2019-04-22 ENCOUNTER — HOSPITAL ENCOUNTER (OUTPATIENT)
Dept: HOSPITAL 47 - RADUSWWP | Age: 59
Discharge: HOME | End: 2019-04-22
Attending: FAMILY MEDICINE
Payer: COMMERCIAL

## 2019-04-22 DIAGNOSIS — I25.10: ICD-10-CM

## 2019-04-22 DIAGNOSIS — R42: Primary | ICD-10-CM

## 2019-04-22 PROCEDURE — 93880 EXTRACRANIAL BILAT STUDY: CPT

## 2019-04-23 NOTE — US
EXAMINATION TYPE: US carotid duplex BILAT

 

DATE OF EXAM: 4/22/2019

 

COMPARISON: NONE

 

CLINICAL HISTORY: R42 Vertigo,I25.10 CAD; vertigo with positional changes; CABG; diabetic

 

EXAM MEASUREMENTS: 

 

RIGHT:  Peak Systolic Velocity (PSV) cm/sec

----- Right CCA:  80.9  

----- Right ICA:  91.4     

----- Right ECA:  30.5   

ICA/CCA ratio:  1.1    

 

RIGHT:  End Diastole cm/sec

----- Right CCA:  16.0   

----- Right ICA:  30.5      

----- Right ECA:  0.0     

 

LEFT:  Peak Systolic Velocity (PSV) cm/sec

----- Left CCA:  82.3  

----- Left ICA:  84.9   

----- Left ECA:  69.0  

ICA/CCA ratio:  1.0  

 

LEFT:  End Diastole cm/sec

----- Left CCA:  15.0  

----- Left ICA:  22.8   

----- Left ECA:  0.0 

 

VERTEBRALS (direction of flow):

Right Vertebral: Antegrade

Left Vertebral: Antegrade

 

Rhythm:  Normal

 

 

 

 

IMPRESSION: Mild to moderate mixed intimal wall changes are noted at bilateral carotid bifurcation, b
ut PSV is wnl bilaterally.

   

 

 

Criteria for Assigning % of Stenosis / Diameter reduction

(Estimation based on the indirect measurements of the internal carotid artery velocities (ICA PSV).

1.  Normal (no stenosis)=ICA PSV < 125 cm/s: ratio < 2.0: ICA EDV<40 cm/s.

2. Less than 50% stenosis=ICA PSV < 125 cm/s: ratio < 2.0: ICA EDV<40 cm/s.

3.  50 to 69% stenosis=ICA PSV of 125 to 230 cm/s: ration 2.0 ? 4.0: ICA EDV  cm/s.

4.  Greater than 70% stenosis to near occlusion= ICA PSV > 230 cm/s: ratio > 4.0: ICA EDV > 100 cm/s.
 

5.  Near occlusion= ICA PSV velocities may be low or undetectable: variable ratio and ICA EDV.

6.  Total occlusion=unable to detect flow.

## 2021-04-29 ENCOUNTER — HOSPITAL ENCOUNTER (OUTPATIENT)
Dept: HOSPITAL 47 - ORWHC2ENDO | Age: 61
Discharge: HOME | End: 2021-04-29
Attending: SURGERY
Payer: COMMERCIAL

## 2021-04-29 VITALS — SYSTOLIC BLOOD PRESSURE: 142 MMHG | DIASTOLIC BLOOD PRESSURE: 82 MMHG | HEART RATE: 88 BPM | RESPIRATION RATE: 20 BRPM

## 2021-04-29 VITALS — BODY MASS INDEX: 29.8 KG/M2

## 2021-04-29 VITALS — TEMPERATURE: 97.7 F

## 2021-04-29 DIAGNOSIS — Z79.899: ICD-10-CM

## 2021-04-29 DIAGNOSIS — Z95.810: ICD-10-CM

## 2021-04-29 DIAGNOSIS — K44.9: ICD-10-CM

## 2021-04-29 DIAGNOSIS — I25.10: ICD-10-CM

## 2021-04-29 DIAGNOSIS — E11.9: ICD-10-CM

## 2021-04-29 DIAGNOSIS — Z79.4: ICD-10-CM

## 2021-04-29 DIAGNOSIS — E78.5: ICD-10-CM

## 2021-04-29 DIAGNOSIS — K20.0: ICD-10-CM

## 2021-04-29 DIAGNOSIS — Z95.1: ICD-10-CM

## 2021-04-29 DIAGNOSIS — Z79.82: ICD-10-CM

## 2021-04-29 DIAGNOSIS — Z80.42: ICD-10-CM

## 2021-04-29 DIAGNOSIS — K21.9: ICD-10-CM

## 2021-04-29 DIAGNOSIS — Z98.41: ICD-10-CM

## 2021-04-29 DIAGNOSIS — K29.50: ICD-10-CM

## 2021-04-29 DIAGNOSIS — K57.30: ICD-10-CM

## 2021-04-29 DIAGNOSIS — Z90.89: ICD-10-CM

## 2021-04-29 DIAGNOSIS — I10: ICD-10-CM

## 2021-04-29 DIAGNOSIS — N28.89: ICD-10-CM

## 2021-04-29 DIAGNOSIS — Z12.11: Primary | ICD-10-CM

## 2021-04-29 DIAGNOSIS — Z90.49: ICD-10-CM

## 2021-04-29 DIAGNOSIS — Z87.891: ICD-10-CM

## 2021-04-29 DIAGNOSIS — Z97.2: ICD-10-CM

## 2021-04-29 LAB — GLUCOSE BLD-MCNC: 155 MG/DL (ref 75–99)

## 2021-04-29 PROCEDURE — 43239 EGD BIOPSY SINGLE/MULTIPLE: CPT

## 2021-04-29 PROCEDURE — 88305 TISSUE EXAM BY PATHOLOGIST: CPT

## 2021-04-29 PROCEDURE — 45378 DIAGNOSTIC COLONOSCOPY: CPT

## 2021-04-29 NOTE — P.OP
Date of Procedure: 04/29/21


Preoperative Diagnosis: 


GERD





Screening colonoscopy


Postoperative Diagnosis: 


Antral gastritis





Small hiatal hernia





Mild esophagitis


Procedure(s) Performed: 


EGD





Screening colonoscopy


Anesthesia: MAC


Surgeon: Yadiel Argueta


Pathology: other (Antrum, esophagus)


Condition: stable


Disposition: PACU


Description of Procedure: 


The patient's placed on the endoscopy table lateral position.  He received IV 

sedation.  The gastroscope placed oropharynx passed in the esophagus and 

stomach.  Scope was then placed through the pylorus.  First and second portion 

of the duodenum appeared normal.  Scope was then brought back the antrum and 

this was mildly inflamed.  A biopsies performed.  Scope was then retroflexed and

the patient was found to have a small sliding hiatal hernia.  The GE junction 

was at 40 cm the distal esophagus inflamed a biopsies performed.  The proximal 

esophagus appeared normal.  Scope was withdrawn for patient.





Next digital rectal exam was performed which revealed no abnormalities.  The 

flexible colonoscope was then placed the patient's anus and passed throughout 

the entire colon.  The ileocecal valve was visualized.  Cecum, ascending and 

transverse colon appeared normal.  In the descending colon there was a few 

scattered diverticula.  In the sigmoid colon there was a few more diverticula 

seen.  There was no evidence of diverticulitis.  Scope was then brought back the

rectum and this appeared normal.  The scope was withdrawn for patient.

## 2021-04-29 NOTE — P.GSHP
History of Present Illness


H&P Date: 04/29/21


Chief Complaint: GERD, screening colonoscopy





This a 60-year-old male who presents today for EGD and screening colonoscopy 

he's had issues with GERD.





Past Medical History


Past Medical History: Coronary Artery Disease (CAD), Diabetes Mellitus, 

GERD/Reflux, Hyperlipidemia, Hypertension, Renal Disease


Additional Past Medical History / Comment(s): SEE DR CARSON'S H&P, Blockage

in L kidney. Hx. of detached retina. Recent Iron infusions x 3 at [facility] approx 2 weeks.


History of Any Multi-Drug Resistant Organisms: None Reported


Past Surgical History: AICD, Appendectomy, Back Surgery, Cholecystectomy, 

Coronary Bypass/CABG, Heart Catheterization


Additional Past Surgical History / Comment(s): CABG X5,.  LEFT EYE RETINAL SX 

X2.  RT CATARACT SX


Past Anesthesia/Blood Transfusion Reactions: Postoperative Nausea & Vomiting 

(PONV)


Type of Cardiac Device: AICD


Device Placement Date:: 2016


Smoking Status: Former smoker





- Past Family History


  ** Father


Family Medical History: Cancer


Additional Family Medical History / Comment(s): PROSTATE





Medications and Allergies


                                Home Medications











 Medication  Instructions  Recorded  Confirmed  Type


 


Aspirin [Adult Low Dose Aspirin EC] 81 mg PO QAM 05/31/16 04/29/21 History


 


Metoprolol Tartrate [Lopressor] 50 mg PO TID 05/31/16 04/29/21 History


 


prednisoLONE ACETATE 1% OPHTH 1 drop RIGHT EYE HS 05/31/16 04/29/21 History





[Pred Forte 1%]    


 


Atorvastatin [Lipitor] 80 mg PO HS 07/10/17 04/29/21 History


 


Losartan Potassium [Cozaar] 50 mg PO DAILY 07/10/17 04/29/21 History


 


Insulin Glargine,Hum.rec.anlog 32 unit SQ HS 08/21/18 04/29/21 History





[Basaglar Kwikpen U-100]    


 


Ferrous Sulfate [Feosol] 325 mg PO DAILY 04/27/21 04/29/21 History


 


Lactulose 10 gm PO DAILY 04/27/21 04/29/21 History


 


Linaclotide [Linzess] 145 mcg PO DAILY 04/27/21 04/29/21 History


 


Loratadine [Claritin] 10 mg PO DAILY 04/27/21 04/29/21 History


 


amLODIPine [Norvasc] 5 mg PO DAILY 04/27/21 04/29/21 History


 


calcitrioL [Calcitriol] 0.25 mcg PO DAILY 04/27/21 04/29/21 History








                                    Allergies











Allergy/AdvReac Type Severity Reaction Status Date / Time


 


No Known Allergies Allergy   Verified 04/29/21 08:20














Surgical - Exam


                                   Vital Signs











Temp Pulse Resp BP Pulse Ox


 


 97.7 F   84   16   172/79   99 


 


 04/29/21 08:11  04/29/21 08:11  04/29/21 08:11  04/29/21 08:11  04/29/21 08:11














- General


well developed, well nourished, no distress





- Eyes


PERRL





- ENT


normal pinna





- Neck


no masses





- Respiratory


normal expansion





- Cardiovascular


Rhythm: regular





- Abdomen


Abdomen: soft, non tender





Assessment and Plan


Assessment: 





GERD.  We'll perform EGD.  We'll also perform screening colonoscopy

## 2023-07-04 ENCOUNTER — HOSPITAL ENCOUNTER (EMERGENCY)
Dept: HOSPITAL 47 - EC | Age: 63
Discharge: HOME | End: 2023-07-04
Payer: COMMERCIAL

## 2023-07-04 VITALS
DIASTOLIC BLOOD PRESSURE: 76 MMHG | SYSTOLIC BLOOD PRESSURE: 146 MMHG | HEART RATE: 75 BPM | TEMPERATURE: 98.3 F | RESPIRATION RATE: 18 BRPM

## 2023-07-04 DIAGNOSIS — I25.10: ICD-10-CM

## 2023-07-04 DIAGNOSIS — I10: ICD-10-CM

## 2023-07-04 DIAGNOSIS — N13.2: Primary | ICD-10-CM

## 2023-07-04 DIAGNOSIS — E78.5: ICD-10-CM

## 2023-07-04 DIAGNOSIS — Z79.899: ICD-10-CM

## 2023-07-04 DIAGNOSIS — Z79.82: ICD-10-CM

## 2023-07-04 DIAGNOSIS — F17.200: ICD-10-CM

## 2023-07-04 DIAGNOSIS — E11.9: ICD-10-CM

## 2023-07-04 DIAGNOSIS — Z79.4: ICD-10-CM

## 2023-07-04 LAB
ALBUMIN SERPL-MCNC: 3.8 G/DL (ref 3.5–5)
ALP SERPL-CCNC: 109 U/L (ref 38–126)
ALT SERPL-CCNC: 28 U/L (ref 4–49)
AMYLASE SERPL-CCNC: 82 U/L (ref 30–110)
ANION GAP SERPL CALC-SCNC: 8 MMOL/L
AST SERPL-CCNC: 36 U/L (ref 17–59)
BASOPHILS # BLD AUTO: 0 K/UL (ref 0–0.2)
BASOPHILS NFR BLD AUTO: 0 %
BUN SERPL-SCNC: 24 MG/DL (ref 9–20)
CALCIUM SPEC-MCNC: 8.3 MG/DL (ref 8.4–10.2)
CHLORIDE SERPL-SCNC: 105 MMOL/L (ref 98–107)
CO2 SERPL-SCNC: 25 MMOL/L (ref 22–30)
EOSINOPHIL # BLD AUTO: 0.1 K/UL (ref 0–0.7)
EOSINOPHIL NFR BLD AUTO: 2 %
ERYTHROCYTE [DISTWIDTH] IN BLOOD BY AUTOMATED COUNT: 4.87 M/UL (ref 4.3–5.9)
ERYTHROCYTE [DISTWIDTH] IN BLOOD: 14.4 % (ref 11.5–15.5)
GLUCOSE SERPL-MCNC: 189 MG/DL (ref 74–99)
GLUCOSE UR QL: (no result)
HCT VFR BLD AUTO: 45.5 % (ref 39–53)
HGB BLD-MCNC: 14.5 GM/DL (ref 13–17.5)
LIPASE SERPL-CCNC: 238 U/L (ref 23–300)
LYMPHOCYTES # SPEC AUTO: 0.9 K/UL (ref 1–4.8)
LYMPHOCYTES NFR SPEC AUTO: 15 %
MCH RBC QN AUTO: 29.8 PG (ref 25–35)
MCHC RBC AUTO-ENTMCNC: 31.9 G/DL (ref 31–37)
MCV RBC AUTO: 93.4 FL (ref 80–100)
MONOCYTES # BLD AUTO: 0.5 K/UL (ref 0–1)
MONOCYTES NFR BLD AUTO: 9 %
NEUTROPHILS # BLD AUTO: 4.4 K/UL (ref 1.3–7.7)
NEUTROPHILS NFR BLD AUTO: 74 %
PH UR: 6 [PH] (ref 5–8)
PLATELET # BLD AUTO: 201 K/UL (ref 150–450)
POTASSIUM SERPL-SCNC: 4.5 MMOL/L (ref 3.5–5.1)
PROT SERPL-MCNC: 6.7 G/DL (ref 6.3–8.2)
PROT UR QL: (no result)
RBC UR QL: 1 /HPF (ref 0–5)
SODIUM SERPL-SCNC: 138 MMOL/L (ref 137–145)
SP GR UR: 1.02 (ref 1–1.03)
SQUAMOUS UR QL AUTO: <1 /HPF (ref 0–4)
UROBILINOGEN UR QL STRIP: <2 MG/DL (ref ?–2)
WBC # BLD AUTO: 6 K/UL (ref 3.8–10.6)
WBC #/AREA URNS HPF: <1 /HPF (ref 0–5)

## 2023-07-04 PROCEDURE — 80053 COMPREHEN METABOLIC PANEL: CPT

## 2023-07-04 PROCEDURE — 83605 ASSAY OF LACTIC ACID: CPT

## 2023-07-04 PROCEDURE — 74176 CT ABD & PELVIS W/O CONTRAST: CPT

## 2023-07-04 PROCEDURE — 83690 ASSAY OF LIPASE: CPT

## 2023-07-04 PROCEDURE — 96375 TX/PRO/DX INJ NEW DRUG ADDON: CPT

## 2023-07-04 PROCEDURE — 81001 URINALYSIS AUTO W/SCOPE: CPT

## 2023-07-04 PROCEDURE — 36415 COLL VENOUS BLD VENIPUNCTURE: CPT

## 2023-07-04 PROCEDURE — 99284 EMERGENCY DEPT VISIT MOD MDM: CPT

## 2023-07-04 PROCEDURE — 82150 ASSAY OF AMYLASE: CPT

## 2023-07-04 PROCEDURE — 96361 HYDRATE IV INFUSION ADD-ON: CPT

## 2023-07-04 PROCEDURE — 85025 COMPLETE CBC W/AUTO DIFF WBC: CPT

## 2023-07-04 PROCEDURE — 96374 THER/PROPH/DIAG INJ IV PUSH: CPT

## 2023-07-04 NOTE — ED
Abdominal Pain HPI





- General


Chief Complaint: Abdominal Pain


Stated Complaint: Abd pain


Time Seen by Provider: 07/04/23 05:29


Source: EMS


Mode of arrival: EMS


Limitations: no limitations





- History of Present Illness


Initial Comments: 





This patient is a 62-year-old man who presents to evaluation of left-sided 

abdominal pain that had come on tonight.  The pain came on after he had eaten.


MD Complaint: abdominal pain


-: hour(s)


Location: [facility], [facility]


Radiation: none


Migration to: no migration


Severity: severe


Quality: aching, sharp


Consistency: colicky


Improves With: nothing


Worsens With: nothing


Associated Symptoms: nausea, constipation





- Related Data


                                Home Medications











 Medication  Instructions  Recorded  Confirmed


 


Aspirin [Adult Low Dose Aspirin EC] 81 mg PO QAM 05/31/16 04/14/23


 


Metoprolol Tartrate [Lopressor] 50 mg PO TID 05/31/16 04/14/23


 


prednisoLONE ACETATE 1% OPHTH 1 drop RIGHT EYE HS 05/31/16 04/14/23





[Pred Forte 1%]   


 


Atorvastatin [Lipitor] 80 mg PO HS 07/10/17 04/14/23


 


Losartan Potassium [Cozaar] 50 mg PO DAILY 07/10/17 04/14/23


 


Insulin Glargine,Hum.rec.anlog 32 unit SQ HS 08/21/18 04/14/23





[Basaglar Kwikpen U-100]   


 


Ferrous Sulfate [Feosol] 325 mg PO DAILY 04/27/21 04/14/23


 


Lactulose 10 gm PO DAILY 04/27/21 04/14/23


 


Linaclotide [Linzess] 145 mcg PO DAILY 04/27/21 04/14/23


 


Loratadine [Claritin] 10 mg PO DAILY 04/27/21 04/14/23


 


amLODIPine [Norvasc] 5 mg PO DAILY 04/27/21 04/14/23


 


calcitrioL [Calcitriol] 0.25 mcg PO DAILY 04/27/21 04/14/23








                                  Previous Rx's











 Medication  Instructions  Recorded


 


HYDROcodone/APAP 5-325MG [Norco 1 tab PO Q4HR PRN 3 Days #18 tab 07/04/23





5-325]  


 


Ondansetron Odt [Zofran ODT] 4 mg PO Q8HR PRN #10 tab 07/04/23


 


Tamsulosin [Flomax] 0.4 mg PO DAILY #14 cap 07/04/23











                                    Allergies











Allergy/AdvReac Type Severity Reaction Status Date / Time


 


No Known Allergies Allergy   Verified 04/14/23 12:20














Review of Systems


ROS Statement: 


Those systems with pertinent positive or pertinent negative responses have been 

documented in the HPI.





ROS Other: All systems not noted in ROS Statement are negative.


Constitutional: Denies: fever, chills


Respiratory: Denies: cough, dyspnea


Cardiovascular: Denies: chest pain, palpitations


Gastrointestinal: Reports: abdominal pain, nausea, constipation.  Denies: 

vomiting, diarrhea, melena, hematochezia


Genitourinary: Denies: dysuria, hematuria, testicular pain, testicular mass


Musculoskeletal: Denies: back pain


Skin: Denies: rash


Neurological: Denies: headache, weakness





Past Medical History


Past Medical History: Coronary Artery Disease (CAD), Diabetes Mellitus, GERD

/Reflux, Hyperlipidemia, Hypertension, Renal Disease


Additional Past Medical History / Comment(s): SEE DR CARSON'S H&P, Blockage

 in L kidney. Hx. of detached retina. Recent Iron infusions x 3 at [facility] approx 2 weeks.


History of Any Multi-Drug Resistant Organisms: None Reported


Past Surgical History: AICD, Appendectomy, Back Surgery, Cholecystectomy, 

Coronary Bypass/CABG, Heart Catheterization


Additional Past Surgical History / Comment(s): CABG X5,.  LEFT EYE RETINAL SX X

2.  RT CATARACT SX


Past Anesthesia/Blood Transfusion Reactions: Postoperative Nausea & Vomiting 

(PONV)


Type of Cardiac Device: AICD


Device Placement Date:: 2016


Past Psychological History: No Psychological Hx Reported


Smoking Status: Current every day smoker





- Past Family History


  ** Father


Family Medical History: Cancer


Additional Family Medical History / Comment(s): PROSTATE





General Exam


Limitations: no limitations


General appearance: alert, in no apparent distress


Head exam: Present: atraumatic, normocephalic


Eye exam: Present: normal appearance.  Absent: scleral icterus, conjunctival 

injection


ENT exam: Present: normal oropharynx


Neck exam: Present: normal inspection


Respiratory exam: Present: normal lung sounds bilaterally.  Absent: respiratory 

distress, wheezes, rales, rhonchi, stridor


Cardiovascular Exam: Present: regular rate, normal rhythm, normal heart sounds. 

 Absent: systolic murmur, diastolic murmur, rubs, gallop


GI/Abdominal exam: Present: soft, tenderness (Left lower quadrant).  Absent: 

distended, guarding, rebound, rigid, mass, pulsatile mass, hernia


Extremities exam: Present: normal inspection, normal capillary refill.  Absent: 

pedal edema


Back exam: Present: normal inspection.  Absent: CVA tenderness (R), CVA 

tenderness (L)


Neurological exam: Present: alert


Skin exam: Present: warm, dry, intact, normal color.  Absent: rash





Course


                                   Vital Signs











  07/04/23 07/04/23 07/04/23





  04:11 05:49 09:00


 


Temperature 98.4 F 98.2 F 98.3 F


 


Pulse Rate 76 721 H 75


 


Respiratory 16 118 H 18





Rate   


 


Blood Pressure 149/83 149/79 146/76


 


O2 Sat by Pulse 97 98 98





Oximetry   














Medical Decision Making





- Medical Decision Making








The patient had computed tomography scan of the abdomen which I interpreted as 

showing left-sided ureteral stone.





Was pt. sent in by a medical professional or institution (, PA, NP, urgent 

care, hospital, or nursing home...) When possible be specific


@  -[No]


Did you speak to anyone other than the patient for history (EMS, parent, family,

 police, friend...)? What history was obtained from this source 


@  -[No]


Did you review nursing and triage notes (agree or disagree)?  Why? 


@  -[I reviewed and agree with nursing and triage notes]


Were old charts reviewed (outside hosp., previous admission, EMS record, old 

EKG, old radiological studies, urgent care reports/EKG's, nursing home records)?

Report findings 


@  -[No old charts were reviewed]


Differential Diagnosis (chest pain, altered mental status, abdominal pain women,

abdominal pain men, vaginal bleeding, weakness, fever, dyspnea, syncope, 

headache, dizziness, GI bleed, back pain, seizure, CVA, palpatations, mental 

health, musculoskeletal)? 


@  -[Differential Abdominal Pain Men:


Appendicitis, cholecystitis, diverticulosis, ischemic bowel, pancreatitis, 

hepatitis, UTI, gastroenteritis, AAA, incarcerated hernia, bowel obstruction, 

constipation, inflammatory bowel, hepatitis, peptic ulcer disease, splenic 

infarction, perforated viscus, testicular torsion, this is not meant to be an 

all-inclusive list


EKG interpreted by me (3pts min.).


@  -[


X-rays interpreted by me (1pt min.).


@  -[None done]


CT interpreted by me (1pt min.).


@  -[As above


U/S interpreted by me (1pt. min.).


@  -[None done]


What testing was considered but not performed or refused? (CT, X-rays, U/S, 

labs)? Why?


@  -[None]


What meds were considered but not given or refused? Why?


@  -[None]


Did you discuss the management of the patient with other professionals 

(professionals i.e. Dr., PA, NP, lab, RT, psych nurse, , , 

teacher, , )? Give summary


@  -[No]


Was smoking cessation discussed for >3mins.?


@  -[No]


Was critical care preformed (if so, how long)?


@  -[No]


Were there social determinants of health that impacted care today? How? 

(Homelessness, low income, unemployed, alcoholism, drug addiction, 

transportation, low edu. Level, literacy, decrease access to med. care, [facility], 

rehab)?


@  -[No]


Was there de-escalation of care discussed even if they declined (Discuss DNR or 

withdrawal of care, Hospice)? DNR status


@  -[No]


What co-morbidities impacted this encounter? (DM, HTN, Smoking, COPD, CAD, 

Cancer, CVA, ARF, Chemo, Hep., AIDS, mental health diagnosis, sleep apnea, 

morbid obesity)?


@  -[None]


Was patient admitted / discharged? Hospital course, mention meds given and 

route, prescriptions, significant lab abnormalities, going to OR and other 

pertinent info.


@  -[The patient has had good relief of symptoms following medication here.  We 

discussed appropriate further care and follow-up as well as return parameters


Undiagnosed new problem with uncertain prognosis?


@  -[No]


Drug Therapy requiring intensive monitoring for toxicity (Heparin, Nitro, 

Insulin, Cardizem)?


@  -[No]


Were any procedures done?


@  -[No]


Diagnosis/symptom?


@  -[Acute abdominal pain


Acute left ureteral stone


Acute, or Chronic, or Acute on Chronic?


@  -[default]


Uncomplicated (without systemic symptoms) or Complicated (systemic symptoms)?


@  -[Uncomplicated


Side effects of treatment?


@  -[No]


Exacerbation, Progression, or Severe Exacerbation?


@  -[No]


Poses a threat to life or bodily function? How? (Chest pain, USA, MI, pneumonia,

 PE, COPD, DKA, ARF, appy, cholecystitis, CVA, Diverticulitis, Homicidal, 

Suicidal, threat to staff... and all critical care pts)


@  -[No]








- Lab Data


Result diagrams: 


                                 07/04/23 04:45





                                 07/04/23 04:45


                                   Lab Results











  07/04/23 07/04/23 07/04/23 Range/Units





  04:45 04:45 04:47 


 


WBC  6.0    (3.8-10.6)  k/uL


 


RBC  4.87    (4.30-5.90)  m/uL


 


Hgb  14.5    (13.0-17.5)  gm/dL


 


Hct  45.5    (39.0-53.0)  %


 


MCV  93.4    (80.0-100.0)  fL


 


MCH  29.8    (25.0-35.0)  pg


 


MCHC  31.9    (31.0-37.0)  g/dL


 


RDW  14.4    (11.5-15.5)  %


 


Plt Count  201    (150-450)  k/uL


 


MPV  7.7    


 


Neutrophils %  74    %


 


Lymphocytes %  15    %


 


Monocytes %  9    %


 


Eosinophils %  2    %


 


Basophils %  0    %


 


Neutrophils #  4.4    (1.3-7.7)  k/uL


 


Lymphocytes #  0.9 L    (1.0-4.8)  k/uL


 


Monocytes #  0.5    (0-1.0)  k/uL


 


Eosinophils #  0.1    (0-0.7)  k/uL


 


Basophils #  0.0    (0-0.2)  k/uL


 


Sodium   138   (137-145)  mmol/L


 


Potassium   4.5   (3.5-5.1)  mmol/L


 


Chloride   105   ()  mmol/L


 


Carbon Dioxide   25   (22-30)  mmol/L


 


Anion Gap   8   mmol/L


 


BUN   24 H   (9-20)  mg/dL


 


Creatinine   2.39 H   (0.66-1.25)  mg/dL


 


Est GFR (CKD-EPI)AfAm   32   (>60 ml/min/1.73 sqM)  


 


Est GFR (CKD-EPI)NonAf   28   (>60 ml/min/1.73 sqM)  


 


Glucose   189 H   (74-99)  mg/dL


 


Plasma Lactic Acid Kevin    1.0  (0.7-2.0)  mmol/L


 


Calcium   8.3 L   (8.4-10.2)  mg/dL


 


Total Bilirubin   0.6   (0.2-1.3)  mg/dL


 


AST   36   (17-59)  U/L


 


ALT   28   (4-49)  U/L


 


Alkaline Phosphatase   109   ()  U/L


 


Total Protein   6.7   (6.3-8.2)  g/dL


 


Albumin   3.8   (3.5-5.0)  g/dL


 


Amylase   82   ()  U/L


 


Lipase   238   ()  U/L


 


Urine Color     


 


Urine Appearance     (Clear)  


 


Urine pH     (5.0-8.0)  


 


Ur Specific Gravity     (1.001-1.035)  


 


Urine Protein     (Negative)  


 


Urine Glucose (UA)     (Negative)  


 


Urine Ketones     (Negative)  


 


Urine Blood     (Negative)  


 


Urine Nitrite     (Negative)  


 


Urine Bilirubin     (Negative)  


 


Urine Urobilinogen     (<2.0)  mg/dL


 


Ur Leukocyte Esterase     (Negative)  


 


Urine RBC     (0-5)  /hpf


 


Urine WBC     (0-5)  /hpf


 


Ur Squamous Epith Cells     (0-4)  /hpf


 


Urine Mucus     (None)  /hpf














  07/04/23 Range/Units





  06:42 


 


WBC   (3.8-10.6)  k/uL


 


RBC   (4.30-5.90)  m/uL


 


Hgb   (13.0-17.5)  gm/dL


 


Hct   (39.0-53.0)  %


 


MCV   (80.0-100.0)  fL


 


MCH   (25.0-35.0)  pg


 


MCHC   (31.0-37.0)  g/dL


 


RDW   (11.5-15.5)  %


 


Plt Count   (150-450)  k/uL


 


MPV   


 


Neutrophils %   %


 


Lymphocytes %   %


 


Monocytes %   %


 


Eosinophils %   %


 


Basophils %   %


 


Neutrophils #   (1.3-7.7)  k/uL


 


Lymphocytes #   (1.0-4.8)  k/uL


 


Monocytes #   (0-1.0)  k/uL


 


Eosinophils #   (0-0.7)  k/uL


 


Basophils #   (0-0.2)  k/uL


 


Sodium   (137-145)  mmol/L


 


Potassium   (3.5-5.1)  mmol/L


 


Chloride   ()  mmol/L


 


Carbon Dioxide   (22-30)  mmol/L


 


Anion Gap   mmol/L


 


BUN   (9-20)  mg/dL


 


Creatinine   (0.66-1.25)  mg/dL


 


Est GFR (CKD-EPI)AfAm   (>60 ml/min/1.73 sqM)  


 


Est GFR (CKD-EPI)NonAf   (>60 ml/min/1.73 sqM)  


 


Glucose   (74-99)  mg/dL


 


Plasma Lactic Acid Kevin   (0.7-2.0)  mmol/L


 


Calcium   (8.4-10.2)  mg/dL


 


Total Bilirubin   (0.2-1.3)  mg/dL


 


AST   (17-59)  U/L


 


ALT   (4-49)  U/L


 


Alkaline Phosphatase   ()  U/L


 


Total Protein   (6.3-8.2)  g/dL


 


Albumin   (3.5-5.0)  g/dL


 


Amylase   ()  U/L


 


Lipase   ()  U/L


 


Urine Color  Light Yellow  


 


Urine Appearance  Clear  (Clear)  


 


Urine pH  6.0  (5.0-8.0)  


 


Ur Specific Gravity  1.017  (1.001-1.035)  


 


Urine Protein  1+ H  (Negative)  


 


Urine Glucose (UA)  4+ H  (Negative)  


 


Urine Ketones  Trace H  (Negative)  


 


Urine Blood  Trace H  (Negative)  


 


Urine Nitrite  Negative  (Negative)  


 


Urine Bilirubin  Negative  (Negative)  


 


Urine Urobilinogen  <2.0  (<2.0)  mg/dL


 


Ur Leukocyte Esterase  Negative  (Negative)  


 


Urine RBC  1  (0-5)  /hpf


 


Urine WBC  <1  (0-5)  /hpf


 


Ur Squamous Epith Cells  <1  (0-4)  /hpf


 


Urine Mucus  Rare H  (None)  /hpf














Disposition


Clinical Impression: 


 Calculus of kidney





Disposition: HOME SELF-CARE


Condition: Good


Instructions (If sedation given, give patient instructions):  Kidney Stones (ED)


Prescriptions: 


Tamsulosin [Flomax] 0.4 mg PO DAILY #14 cap


HYDROcodone/APAP 5-325MG [Norco 5-325] 1 tab PO Q4HR PRN 3 Days #18 tab


 PRN Reason: Pain


Ondansetron Odt [Zofran ODT] 4 mg PO Q8HR PRN #10 tab


 PRN Reason: Nausea


Is patient prescribed a controlled substance at d/c from ED?: Yes


Referrals: 


Julia Mujica NPC [Family Provider] - 1-2 days

## 2023-07-04 NOTE — CT
EXAMINATION TYPE: CT abdomen pelvis wo con

 

DATE OF EXAM: 7/4/2023

 

COMPARISON: 8/29/2018

 

INDICATION: Left lower quadrant pain

 

DLP: 993.2 mGycm, Automated exposure control for dose reduction was used.

 

CONTRAST:  0 mL of Isovue 300. 

                        Study performed without Oral Contrast

 

TECHNIQUE: Axial images were obtained from above the diaphragm to the pubic rami in the axial plane a
t 5 mm thick sections.  Reconstructed images are reviewed on the computer in the coronal plane.  

 

FINDINGS:

 

Limited CT sections are obtained the lung bases.  The lung bases are clear.  Coronary artery calcific
ation is present.

 

CT ABDOMEN:

 

Liver: Normal

 

Spleen: Normal

 

Pancreas: Normal

 

Adrenal glands: The adrenal glands are normal.

 

Gallbladder: Surgically absent  

 

Kidneys: No masses are evident. There is moderate to marked left hydronephrosis with hydroureter. Pro
ximal right ureter and there is a 0.4 cm obstructing renal stone. A nonobstructing renal stoner is at
 the inferior pole left kidney measuring 0.6 cm. There are additional punctate nonobstructing renal s
tones in the posterior midinferior left kidney. No right-sided renal stones are evident. Perinephric 
stranding is present bilaterally greater on the left. No cysts are present.

 

Aorta: Vascular calcification is within the aorta. 

 

Inferior vena cava: Normal.

 

CT PELVIS: 

Loops of bowel within the abdomen and pelvis are normal.     Study is without oral contrast limiting 
bowel evaluation.

 

Appendix: Not identified. No dilated tubular structure or inflammatory changes evident.

 

Urinary bladder: Normal. 

 

Genitourinary structures: Prostate is prominent.

 

Osseous structures: No suspicious lytic or sclerotic lesions. Degenerative disc changes are present L
5-S1.

 

IMPRESSIONS:

1.  Obstructing 0 0.4 centimeter proximal left ureteral stone with moderate to marked left hydronephr
osis. Couple of additional nonobstructing left renal stones are present.

2. No suspicious changes for acute diverticulitis.

## 2024-05-23 ENCOUNTER — HOSPITAL ENCOUNTER (OUTPATIENT)
Dept: HOSPITAL 47 - ORWHC2ENDO | Age: 64
Discharge: HOME | End: 2024-05-23
Payer: COMMERCIAL

## 2024-05-23 VITALS — HEART RATE: 84 BPM | DIASTOLIC BLOOD PRESSURE: 53 MMHG | SYSTOLIC BLOOD PRESSURE: 98 MMHG | RESPIRATION RATE: 14 BRPM

## 2024-05-23 VITALS — TEMPERATURE: 97 F

## 2024-05-23 DIAGNOSIS — E78.5: ICD-10-CM

## 2024-05-23 DIAGNOSIS — D12.3: ICD-10-CM

## 2024-05-23 DIAGNOSIS — K21.9: ICD-10-CM

## 2024-05-23 DIAGNOSIS — Z90.49: ICD-10-CM

## 2024-05-23 DIAGNOSIS — I25.10: ICD-10-CM

## 2024-05-23 DIAGNOSIS — Z87.442: ICD-10-CM

## 2024-05-23 DIAGNOSIS — Z95.1: ICD-10-CM

## 2024-05-23 DIAGNOSIS — Z79.84: ICD-10-CM

## 2024-05-23 DIAGNOSIS — K29.50: Primary | ICD-10-CM

## 2024-05-23 DIAGNOSIS — I10: ICD-10-CM

## 2024-05-23 DIAGNOSIS — Z95.810: ICD-10-CM

## 2024-05-23 DIAGNOSIS — F17.200: ICD-10-CM

## 2024-05-23 LAB
GLUCOSE BLD-MCNC: 111 MG/DL (ref 70–110)
GLUCOSE BLD-MCNC: 95 MG/DL (ref 70–110)

## 2024-05-23 PROCEDURE — 45385 COLONOSCOPY W/LESION REMOVAL: CPT

## 2024-05-23 PROCEDURE — 88305 TISSUE EXAM BY PATHOLOGIST: CPT

## 2024-05-23 PROCEDURE — 45381 COLONOSCOPY SUBMUCOUS NJX: CPT

## 2024-05-23 PROCEDURE — 43239 EGD BIOPSY SINGLE/MULTIPLE: CPT

## 2024-05-23 RX ADMIN — POTASSIUM CHLORIDE SCH MLS: 14.9 INJECTION, SOLUTION INTRAVENOUS at 07:07

## 2024-05-23 NOTE — P.GSHP
History of Present Illness


H&P Date: 05/23/24


Chief Complaint: GERD, diarrhea





this is a 63-year-old male who presents today for EGD and colonoscopy.  Patient 

is GERD and diarrhea.





Past Medical History


Past Medical History: Coronary Artery Disease (CAD), Diabetes Mellitus, GERD/Ref

lux, Hyperlipidemia, Hypertension, Renal Disease


Additional Past Medical History / Comment(s): SEE DR CARSON'S H&P, Blockage

in L kidney. Hx. of detached retina. Recent Iron infusions x 3 at [facility] approx 2 weeks. kidney stone


History of Any Multi-Drug Resistant Organisms: None Reported


Past Surgical History: AICD, Appendectomy, Back Surgery, Cholecystectomy, 

Coronary Bypass/CABG, Heart Catheterization


Additional Past Surgical History / Comment(s): CABG X5,.  LEFT EYE RETINAL SX 

X2.  RT CATARACT SX glaucoma surgery


Past Anesthesia/Blood Transfusion Reactions: Postoperative Nausea & Vomiting 

(PONV)


Type of Cardiac Device: AICD


Device Placement Date:: 2016


Smoking Status: Current every day smoker





- Past Family History


  ** Father


Family Medical History: Cancer


Additional Family Medical History / Comment(s): PROSTATE





Medications and Allergies


                                Home Medications











 Medication  Instructions  Recorded  Confirmed  Type


 


Aspirin [Adult Low Dose Aspirin EC] 81 mg PO QAM 05/31/16 05/23/24 History


 


Metoprolol Tartrate [Lopressor] 25 mg PO BID 05/31/16 05/23/24 History


 


prednisoLONE ACETATE 1% OPHTH 1 drop RIGHT EYE AS DIRECTED 05/31/16 05/23/24 

History





[Pred Forte 1%]    


 


Atorvastatin [Lipitor] 80 mg PO HS 07/10/17 05/23/24 History


 


Ferrous Sulfate [Feosol] 325 mg PO DAILY 04/27/21 05/23/24 History


 


calcitrioL 0.25 mcg PO AS DIRECTED 04/27/21 05/23/24 History


 


Empagliflozin [Jardiance] 10 mg PO DAILY 05/21/24 05/23/24 History


 


Insulin Glargine,Hum.rec.anlog 30 units INJ HS 05/21/24 05/23/24 History





[Lantus Solostar Pen]    


 


Magnesium Oxide [Mag-Ox] 400 mg PO Q48H 05/21/24 05/23/24 History


 


Sacubitril/Valsartan [Entresto 24 1 tab PO BID 05/21/24 05/23/24 History





mg-26 mg Tablet]    


 


Vit C(Unk) 500 mg PO DAILY 05/21/24 05/23/24 History


 


Vit D2(Unk) 1.25 mg PO Q30D 05/21/24 05/23/24 History


 


allopurinoL 100 mg PO DAILY 05/21/24 05/23/24 History


 


glipiZIDE 5 mg PO DAILY 05/21/24 05/23/24 History








                                    Allergies











Allergy/AdvReac Type Severity Reaction Status Date / Time


 


No Known Allergies Allergy   Verified 05/23/24 07:19














Surgical - Exam


                                   Vital Signs











Temp Pulse Resp BP Pulse Ox


 


 97.0 F L  88   16   136/70   100 


 


 05/23/24 07:15  05/23/24 07:15  05/23/24 07:15  05/23/24 07:15  05/23/24 07:15














- General


well developed, well nourished, no distress





- Eyes


PERRL





- ENT


normal pinna





- Neck


no masses





- Respiratory


normal expansion





- Cardiovascular


Rhythm: regular





- Abdomen


Abdomen: soft, non tender





Results





- Labs


                  Abnormal Lab Results - Last 24 Hours (Table)











  05/23/24 Range/Units





  07:13 


 


POC Glucose (mg/dL)  111 H  ()  mg/dL














Assessment and Plan


Assessment: 





GERD, diarrhea


Plan: 





perform EGD and colonoscopy.

## 2024-05-23 NOTE — P.OP
Date of Procedure: 05/23/24


Preoperative Diagnosis: 


diarrhea





GERD


Postoperative Diagnosis: 


antral gastritis





Transverse colon polyp





Diverticulosis





Right colon tumor


Procedure(s) Performed: 


EGD





Colonoscopy


Anesthesia: MAC


Surgeon: Yadiel Argueta


Pathology: other (antral gastritis, transverse colon polyp, right colon mass)


Condition: critical


Disposition: PACU


Description of Procedure: 


the patient's placed on the endoscopy table in the lateral position.  He 

received IV sedation.  The gastroscope was placed oropharynx passed in the 

esophagus and stomach.  Scope was placed through the pylorus.  First and second 

portion of the duodenum appeared normal.  Scope was then brought back the antrum

appeared mildly inflamed.  A biopsies performed.  The scope was unretroflexed 

and remainder the stomach.  The GE junction was at 40 cm.  The distal esophagus.

 Normal.  The proximal esophagus appeared normal scope withdrawn for patient.





Next digital rectal exam was performed.  This revealed no abnormalities.  The 

flexible colonoscope was then placed patient anus and passed throughout the 

entire colon.  The ileocecal valve was visualized.  The cecum appeared normal.  

In the mid right colon there was a was biopsied several times the cold forcep.  

Scope was then brought back the distal right colon appeared normal.  In the 

proximal transverse colon there was a polyp seen was removed with the snare.  

There was a few scattered diverticuli in the transverse colon.  In the 

descending; there is more diverticula seen.  Scope was brought back the rectum 

scope withdrawn for patient.

## 2024-06-03 ENCOUNTER — HOSPITAL ENCOUNTER (OUTPATIENT)
Dept: HOSPITAL 47 - LABPAT | Age: 64
Discharge: HOME | End: 2024-06-03
Payer: COMMERCIAL

## 2024-06-03 DIAGNOSIS — C18.9: ICD-10-CM

## 2024-06-03 DIAGNOSIS — Z01.812: Primary | ICD-10-CM

## 2024-06-03 PROCEDURE — 86900 BLOOD TYPING SEROLOGIC ABO: CPT

## 2024-06-03 PROCEDURE — 86901 BLOOD TYPING SEROLOGIC RH(D): CPT

## 2024-06-03 PROCEDURE — 80051 ELECTROLYTE PANEL: CPT

## 2024-06-03 PROCEDURE — 86850 RBC ANTIBODY SCREEN: CPT

## 2024-06-03 PROCEDURE — 85025 COMPLETE CBC W/AUTO DIFF WBC: CPT

## 2024-06-04 LAB
ANION GAP SERPL CALC-SCNC: 9.6 MMOL/L (ref 4–12)
BASOPHILS # BLD AUTO: 0.09 X 10*3/UL (ref 0–0.1)
BASOPHILS NFR BLD AUTO: 1.1 %
CHLORIDE SERPL-SCNC: 108 MMOL/L (ref 96–109)
CO2 SERPL-SCNC: 24.4 MMOL/L (ref 21.6–31.8)
EOSINOPHIL # BLD AUTO: 0.84 X 10*3/UL (ref 0.04–0.35)
EOSINOPHIL NFR BLD AUTO: 9.8 %
ERYTHROCYTE [DISTWIDTH] IN BLOOD BY AUTOMATED COUNT: 4.65 X 10*6/UL (ref 4.4–5.6)
ERYTHROCYTE [DISTWIDTH] IN BLOOD: 14.7 % (ref 11.5–14.5)
HCT VFR BLD AUTO: 46.3 % (ref 39.6–50)
HGB BLD-MCNC: 14.2 G/DL (ref 13–17)
IMM GRANULOCYTES BLD QL AUTO: 0.4 %
LYMPHOCYTES # SPEC AUTO: 1.77 X 10*3/UL (ref 0.9–5)
LYMPHOCYTES NFR SPEC AUTO: 20.7 %
MCH RBC QN AUTO: 30.5 PG (ref 27–32)
MCHC RBC AUTO-ENTMCNC: 30.7 G/DL (ref 32–37)
MCV RBC AUTO: 99.6 FL (ref 80–97)
MONOCYTES # BLD AUTO: 0.75 X 10*3/UL (ref 0.2–1)
MONOCYTES NFR BLD AUTO: 8.8 %
NEUTROPHILS # BLD AUTO: 5.09 X 10*3/UL (ref 1.8–7.7)
NEUTROPHILS NFR BLD AUTO: 59.2 %
NRBC BLD AUTO-RTO: 0 X 10*3/UL (ref 0–0.01)
PLATELET # BLD AUTO: 283 X 10*3/UL (ref 140–440)
POTASSIUM SERPL-SCNC: 4.9 MMOL/L (ref 3.5–5.5)
SODIUM SERPL-SCNC: 142 MMOL/L (ref 135–145)
WBC # BLD AUTO: 8.57 X 10*3/UL (ref 4.5–10)

## 2024-06-12 ENCOUNTER — HOSPITAL ENCOUNTER (INPATIENT)
Dept: HOSPITAL 47 - 2ORMAIN | Age: 64
LOS: 4 days | Discharge: HOME | DRG: 231 | End: 2024-06-16
Payer: COMMERCIAL

## 2024-06-12 DIAGNOSIS — K63.5: Primary | ICD-10-CM

## 2024-06-12 DIAGNOSIS — E11.9: ICD-10-CM

## 2024-06-12 DIAGNOSIS — Z79.899: ICD-10-CM

## 2024-06-12 DIAGNOSIS — Z95.1: ICD-10-CM

## 2024-06-12 DIAGNOSIS — Z79.82: ICD-10-CM

## 2024-06-12 DIAGNOSIS — F17.200: ICD-10-CM

## 2024-06-12 DIAGNOSIS — D50.9: ICD-10-CM

## 2024-06-12 DIAGNOSIS — Z95.810: ICD-10-CM

## 2024-06-12 DIAGNOSIS — E78.5: ICD-10-CM

## 2024-06-12 DIAGNOSIS — K21.9: ICD-10-CM

## 2024-06-12 DIAGNOSIS — N28.9: ICD-10-CM

## 2024-06-12 DIAGNOSIS — Z87.442: ICD-10-CM

## 2024-06-12 DIAGNOSIS — I25.10: ICD-10-CM

## 2024-06-12 DIAGNOSIS — M10.9: ICD-10-CM

## 2024-06-12 DIAGNOSIS — Z79.4: ICD-10-CM

## 2024-06-12 DIAGNOSIS — I11.9: ICD-10-CM

## 2024-06-12 DIAGNOSIS — Z79.84: ICD-10-CM

## 2024-06-12 LAB
GLUCOSE BLD-MCNC: 140 MG/DL (ref 70–110)
GLUCOSE BLD-MCNC: 168 MG/DL (ref 70–110)
GLUCOSE BLD-MCNC: 170 MG/DL (ref 70–110)

## 2024-06-12 PROCEDURE — 94760 N-INVAS EAR/PLS OXIMETRY 1: CPT

## 2024-06-12 PROCEDURE — 83036 HEMOGLOBIN GLYCOSYLATED A1C: CPT

## 2024-06-12 PROCEDURE — 80048 BASIC METABOLIC PNL TOTAL CA: CPT

## 2024-06-12 PROCEDURE — 0DTF0ZZ RESECTION OF RIGHT LARGE INTESTINE, OPEN APPROACH: ICD-10-PCS

## 2024-06-12 PROCEDURE — 85025 COMPLETE CBC W/AUTO DIFF WBC: CPT

## 2024-06-12 PROCEDURE — 83735 ASSAY OF MAGNESIUM: CPT

## 2024-06-12 PROCEDURE — 88309 TISSUE EXAM BY PATHOLOGIST: CPT

## 2024-06-12 PROCEDURE — 93005 ELECTROCARDIOGRAM TRACING: CPT

## 2024-06-12 RX ADMIN — MIDAZOLAM ONE MG: 1 INJECTION INTRAMUSCULAR; INTRAVENOUS at 08:28

## 2024-06-12 RX ADMIN — POTASSIUM CHLORIDE SCH MLS: 14.9 INJECTION, SOLUTION INTRAVENOUS at 07:56

## 2024-06-12 RX ADMIN — POTASSIUM CHLORIDE ONE MLS: 14.9 INJECTION, SOLUTION INTRAVENOUS at 14:43

## 2024-06-12 RX ADMIN — METRONIDAZOLE PRN MLS: 500 INJECTION, SOLUTION INTRAVENOUS at 09:30

## 2024-06-12 RX ADMIN — HEPARIN SODIUM SCH UNIT: 5000 INJECTION, SOLUTION INTRAVENOUS; SUBCUTANEOUS at 18:01

## 2024-06-12 RX ADMIN — SODIUM CHLORIDE PRN MLS/HR: 9 INJECTION, SOLUTION INTRAVENOUS at 11:38

## 2024-06-12 RX ADMIN — ONDANSETRON ONE MG: 2 INJECTION INTRAMUSCULAR; INTRAVENOUS at 08:24

## 2024-06-12 RX ADMIN — POTASSIUM CHLORIDE ONE MLS: 14.9 INJECTION, SOLUTION INTRAVENOUS at 11:06

## 2024-06-12 RX ADMIN — DEXAMETHASONE SODIUM PHOSPHATE ONE MG: 4 INJECTION, SOLUTION INTRAMUSCULAR; INTRAVENOUS at 08:24

## 2024-06-12 RX ADMIN — ONDANSETRON PRN MG: 2 INJECTION INTRAMUSCULAR; INTRAVENOUS at 15:38

## 2024-06-12 NOTE — P.OP
Date of Procedure: 06/12/24


Preoperative Diagnosis: 


transverse colon polyp


Postoperative Diagnosis: 


right colon mass pathology pending


Procedure(s) Performed: 


right hemicolectomy


Anesthesia: CHERELLE


Surgeon: Yadiel Argueta


Estimated Blood Loss (ml): 50


Pathology: other (right colon)


Condition: stable


Disposition: PACU


Description of Procedure: 


the patient's placed on the operative table in supine position.  He received 

general endotracheal anesthesia.his abdomen was prepped and draped usual sterile

fashion.  Through a midline skin incision the abdominal wall was divided.  Then 

using electrocautery the fascia was divided.  And then the Bookwalter tract with

wound.  The patient had been previously tattooed.  The tattoo isaac was seen on 

the distal right colon.  At this point the right colon was mobilized by dividing

the white line of Toldt.  The transverse colon was mobilized by dividing the 

hepatic flexure attachments.  And then the terminal ileum was transected with 

the YENNI stapler.  The proximal transverse colon was divided with the YENNI 

stapler.  And then using the LigaSure device the mesentery of the bowel was 

divided.  The specimen was opened.  Large polypoid tumor was seen.





Next a side-to-side function end-to-end stapled vessels created using the YENNI 

and TA staplers.  A 3-0 GI silk sutures using a crotch stitch.  The abdomen was 

irrigated there is no bleeding seen.  The fascia then closed loop #1 PDS suture.

 Skin was closed staples.  Patient ttolerated the procedure well.ll.  He was 

sent to recovery room in stable condition.

## 2024-06-13 LAB
BASOPHILS # BLD AUTO: 0 K/UL (ref 0–0.2)
BASOPHILS NFR BLD AUTO: 0 %
EOSINOPHIL # BLD AUTO: 0 K/UL (ref 0–0.7)
EOSINOPHIL NFR BLD AUTO: 0 %
ERYTHROCYTE [DISTWIDTH] IN BLOOD BY AUTOMATED COUNT: 4.08 M/UL (ref 4.3–5.9)
ERYTHROCYTE [DISTWIDTH] IN BLOOD: 13.8 % (ref 11.5–15.5)
GLUCOSE BLD-MCNC: 137 MG/DL (ref 70–110)
GLUCOSE BLD-MCNC: 160 MG/DL (ref 70–110)
GLUCOSE BLD-MCNC: 161 MG/DL (ref 70–110)
GLUCOSE BLD-MCNC: 189 MG/DL (ref 70–110)
HCT VFR BLD AUTO: 41.3 % (ref 39–53)
HGB BLD-MCNC: 12.8 GM/DL (ref 13–17.5)
LYMPHOCYTES # SPEC AUTO: 0.8 K/UL (ref 1–4.8)
LYMPHOCYTES NFR SPEC AUTO: 7 %
MCH RBC QN AUTO: 31.5 PG (ref 25–35)
MCHC RBC AUTO-ENTMCNC: 31.1 G/DL (ref 31–37)
MCV RBC AUTO: 101.3 FL (ref 80–100)
MONOCYTES # BLD AUTO: 1 K/UL (ref 0–1)
MONOCYTES NFR BLD AUTO: 9 %
NEUTROPHILS # BLD AUTO: 9.2 K/UL (ref 1.3–7.7)
NEUTROPHILS NFR BLD AUTO: 83 %
PLATELET # BLD AUTO: 227 K/UL (ref 150–450)
WBC # BLD AUTO: 11.1 K/UL (ref 3.8–10.6)

## 2024-06-13 RX ADMIN — INSULIN ASPART SCH UNIT: 100 INJECTION, SOLUTION INTRAVENOUS; SUBCUTANEOUS at 12:22

## 2024-06-13 RX ADMIN — ATORVASTATIN CALCIUM SCH MG: 80 TABLET, FILM COATED ORAL at 20:56

## 2024-06-13 RX ADMIN — PANTOPRAZOLE SODIUM SCH MG: 40 INJECTION, POWDER, FOR SOLUTION INTRAVENOUS at 12:22

## 2024-06-13 RX ADMIN — METOPROLOL TARTRATE SCH MG: 25 TABLET, FILM COATED ORAL at 20:56

## 2024-06-13 RX ADMIN — Medication SCH MG: at 12:22

## 2024-06-13 RX ADMIN — ALVIMOPAN SCH MG: 12 CAPSULE ORAL at 09:02

## 2024-06-13 RX ADMIN — FAMOTIDINE STA MG: 10 INJECTION, SOLUTION INTRAVENOUS at 09:01

## 2024-06-13 RX ADMIN — PREDNISOLONE ACETATE SCH DROPS: 10 SUSPENSION/ DROPS OPHTHALMIC at 12:52

## 2024-06-13 RX ADMIN — INSULIN DETEMIR SCH UNIT: 100 INJECTION, SOLUTION SUBCUTANEOUS at 20:56

## 2024-06-13 NOTE — P.PN
Subjective


Progress Note Date: 06/13/24





CHIEF COMPLAINT: Transverse colon





HISTORY OF PRESENT ILLNESS: Patient is postop day #1 status post right 

hemicolectomy.  Pain is controlled.  Patient complaining of nausea and 

heartburn.  Patient has epidural in place.  He has been up and ambulating.  

Sitting at bedside chair.  Afebrile.  WBC 11.1 Hgb 12.8 platelets 227





PHYSICAL EXAM: 


VITAL SIGNS: Reviewed.


GENERAL: Well-developed in no acute distress. 


HEENT:  No sclera icterus. Extraocular movements grossly intact.  Moist buccal 

mucosa. Head is atraumatic, normocephalic. 


ABDOMEN:  Soft.  Nondistended.  Abdominal incisional dressing small area of 

blood saturation noted


NEUROLOGIC: Alert and oriented. Cranial nerves II through XII grossly intact.





ASSESSMENT: 


1.  Transverse colon polyp status post right hemicolectomy








PLAN: 


-Continue clear liquid diet


-IV Protonix daily added for GI prophylaxis and Heartburn


-Increase IV fluids to 125 mL/h and monitor


-Encourage patient to increase activity level


-Encourage patient to use incentive spirometer


-Continue epidural and Mccann catheter


-DVT prophylaxis subcu heparin





Physician Assistant note has been reviewed by physician. Signing provider agrees

with the documented findings, assessment, and plan of care.





Objective





- Vital Signs


Vital signs: 


                                   Vital Signs











Temp  97.4 F L  06/13/24 13:24


 


Pulse  82   06/13/24 13:24


 


Resp  19   06/13/24 13:24


 


BP  98/52   06/13/24 13:24


 


Pulse Ox  100   06/13/24 13:24


 


FiO2      








                                 Intake & Output











 06/12/24 06/13/24 06/13/24





 18:59 06:59 18:59


 


Intake Total 1274.4  


 


Output Total 350 575 


 


Balance 924.4 -575 


 


Weight 97 kg  


 


Intake:   


 


  IV 1274.4  


 


Output:   


 


  Urine 300 575 


 


    Uretheral (Mccann)  575 


 


  Estimated Blood Loss 50  


 


Other:   


 


  Voiding Method Indwelling Catheter  Indwelling Catheter














- Labs


CBC & Chem 7: 


                                 06/13/24 07:42





Labs: 


                  Abnormal Lab Results - Last 24 Hours (Table)











  06/12/24 06/13/24 06/13/24 Range/Units





  19:58 05:41 07:42 


 


WBC    11.1 H  (3.8-10.6)  k/uL


 


RBC    4.08 L  (4.30-5.90)  m/uL


 


Hgb    12.8 L  (13.0-17.5)  gm/dL


 


MCV    101.3 H  (80.0-100.0)  fL


 


Neutrophils #    9.2 H  (1.3-7.7)  k/uL


 


Lymphocytes #    0.8 L  (1.0-4.8)  k/uL


 


POC Glucose (mg/dL)  170 H  160 H   ()  mg/dL














  06/13/24 Range/Units





  12:14 


 


WBC   (3.8-10.6)  k/uL


 


RBC   (4.30-5.90)  m/uL


 


Hgb   (13.0-17.5)  gm/dL


 


MCV   (80.0-100.0)  fL


 


Neutrophils #   (1.3-7.7)  k/uL


 


Lymphocytes #   (1.0-4.8)  k/uL


 


POC Glucose (mg/dL)  161 H  ()  mg/dL

## 2024-06-13 NOTE — P.ANPRN
Procedure Note - Anesthesia





- Epidural/Spinal


  ** Epidural Continuous


Time Out Performed: Yes


Date of Procedure: 06/12/24


Procedure Start Time: 08:27


Procedure Stop Time: 08:34


Location of Patient: PreOp


Indication: Acute Post-Operative Pain, Requested by Surgeon


Sedation Type: Sedate with meaningful contact maintained


Preparation: Sterile Dressing


Position: Sitting


Catheter: Indwelling


Needle Guage: 18


Blood Aspirated: No


Pain Paresthesia on Injection Noted: No


Events: Uneventful and Well Tolerated (1.5% plus epi 3 cc was given into the 

epidural space, No adverse reaction noted)

## 2024-06-13 NOTE — P.PN
Progress Note - Text





6/13/24  632am


63-year-old male status post transverse colectomy.  Patient has an epidural 

catheter with a solution running at 60 cc an hour with a VAS of 2, he does have 

complaint of nausea.  He is also complaining of heartburn and asked nurse to 

give him IV Pepcid 20 mg.  Dressing clean dry and intact to continue epidural 

infusion

## 2024-06-14 LAB
ANION GAP SERPL CALC-SCNC: 10.9 MMOL/L (ref 4–12)
BASOPHILS # BLD AUTO: 0.07 X 10*3/UL (ref 0–0.1)
BASOPHILS NFR BLD AUTO: 0.7 %
BUN SERPL-SCNC: 26.1 MG/DL (ref 9–27)
BUN/CREAT SERPL: 14.5 RATIO (ref 12–20)
CALCIUM SPEC-MCNC: 8.3 MG/DL (ref 8.7–10.3)
CHLORIDE SERPL-SCNC: 108 MMOL/L (ref 96–109)
CO2 SERPL-SCNC: 22.1 MMOL/L (ref 21.6–31.8)
EOSINOPHIL # BLD AUTO: 0.13 X 10*3/UL (ref 0.04–0.35)
EOSINOPHIL NFR BLD AUTO: 1.3 %
ERYTHROCYTE [DISTWIDTH] IN BLOOD BY AUTOMATED COUNT: 4.18 X 10*6/UL (ref 4.4–5.6)
ERYTHROCYTE [DISTWIDTH] IN BLOOD: 14.6 % (ref 11.5–14.5)
GLUCOSE BLD-MCNC: 106 MG/DL (ref 70–110)
GLUCOSE BLD-MCNC: 111 MG/DL (ref 70–110)
GLUCOSE BLD-MCNC: 128 MG/DL (ref 70–110)
GLUCOSE BLD-MCNC: 54 MG/DL (ref 70–110)
GLUCOSE BLD-MCNC: 55 MG/DL (ref 70–110)
GLUCOSE BLD-MCNC: 63 MG/DL (ref 70–110)
GLUCOSE BLD-MCNC: 85 MG/DL (ref 70–110)
GLUCOSE BLD-MCNC: 90 MG/DL (ref 70–110)
GLUCOSE SERPL-MCNC: 92 MG/DL (ref 70–110)
HCT VFR BLD AUTO: 40.8 % (ref 39.6–50)
HGB BLD-MCNC: 13 G/DL (ref 13–17)
IMM GRANULOCYTES BLD QL AUTO: 0.2 %
LYMPHOCYTES # SPEC AUTO: 1.7 X 10*3/UL (ref 0.9–5)
LYMPHOCYTES NFR SPEC AUTO: 17.3 %
MAGNESIUM SPEC-SCNC: 1.9 MG/DL (ref 1.5–2.4)
MCH RBC QN AUTO: 31.1 PG (ref 27–32)
MCHC RBC AUTO-ENTMCNC: 31.9 G/DL (ref 32–37)
MCV RBC AUTO: 97.6 FL (ref 80–97)
MONOCYTES # BLD AUTO: 1 X 10*3/UL (ref 0.2–1)
MONOCYTES NFR BLD AUTO: 10.2 %
NEUTROPHILS # BLD AUTO: 6.92 X 10*3/UL (ref 1.8–7.7)
NEUTROPHILS NFR BLD AUTO: 70.3 %
NRBC BLD AUTO-RTO: 0 X 10*3/UL (ref 0–0.01)
PLATELET # BLD AUTO: 225 X 10*3/UL (ref 140–440)
POTASSIUM SERPL-SCNC: 4.5 MMOL/L (ref 3.5–5.5)
SODIUM SERPL-SCNC: 141 MMOL/L (ref 135–145)
WBC # BLD AUTO: 9.84 X 10*3/UL (ref 4.5–10)

## 2024-06-14 RX ADMIN — PANTOPRAZOLE SODIUM SCH MG: 40 INJECTION, POWDER, FOR SOLUTION INTRAVENOUS at 08:48

## 2024-06-14 RX ADMIN — SACUBITRIL AND VALSARTAN SCH EACH: 24; 26 TABLET, FILM COATED ORAL at 21:03

## 2024-06-14 RX ADMIN — DAPAGLIFLOZIN SCH MG: 5 TABLET, FILM COATED ORAL at 08:48

## 2024-06-14 RX ADMIN — OXYCODONE HYDROCHLORIDE AND ACETAMINOPHEN SCH MG: 500 TABLET ORAL at 08:48

## 2024-06-14 NOTE — P.PN
Progress Note - Text





6/14/24 2036


63-year-old male status post transverse colectomy.  Patient has an epidural 

catheter for postop pain control with a solution running at 60 cc an hour with a

VAS of 2.  Dressing clean dry and intact.  Patient has no motor or sensory 

deficits.  Patient is ambulating plan to continue epidural

## 2024-06-14 NOTE — P.PN
Subjective


Progress Note Date: 06/14/24











- Reason for Consult


Consult date: 06/13/24


Medical management, status post right hemicolectomy





- History of Present Illness











This is a pleasant 63-year-old male who was recently admitted under general 

surgery services status post right hemicolectomy.  On exam this morning patient 

continues to report nausea with difficulty tolerating clear liquid diet 

requesting advancing diet and will discuss with general surgery.  Minimal bowel 

sounds noted on exam and patient is reporting belching.  Patient continues with 

epidural with anesthesia following and he reports he feels being too much 

causing some nausea and uneasy feeling.  Will be discussed with general surgery 

along with anesthesia regarding lowering the dose or discontinuing.  Patient 

continues with indwelling Mccann catheter and will continue for now.  Patient 

reports he follows with Julia Mujica in the outpatient setting with a 

past medical history of coronary artery disease, diabetes mellitus, GERD, 

hyperlipidemia, hypertension, renal disease, AICD, and continued ongoing 

nicotine dependence.  Encourage patient to increase activity as tolerated and 

sit up out of the bed more often.  Recommend follow-up labs in the a.m. and also

encouraged continued incentive spirometer use at least 10 times every hour while

awake.  Home medications reviewed and resumed and will continue to monitor and 

follow closely.





6/14/2024


Patient seen and evaluated in follow-up this morning status post right 

hemicolectomy.  Patient was tolerating clear liquids with some nausea diet is 

being advanced and will continue with Zofran and Reglan as needed.  Patient 

continues with pain pump along with indwelling Mccann catheter and possibly 

discontinuing tomorrow.  Patient has been up and walking and encouraged to 

increase activity as tolerated.  Home medications reviewed and resumed as 

appropriate and patient is asking if Entresto can be resumed.  Will resume for 

tomorrow.  Blood pressure more stable today.  Creatinine elevated at 1.8 and 

will continue to monitor as patient does have kidney disease.  Cardiology has 

evaluated the patient.  Patient reports has been passing gas and a very scant 

amount of stool noted that was mostly liquid and mucousy.  Patient denies any 

chest pain or shortness of breath.  Will follow-up with repeat labs and continue

to follow along with general surgery.





Review of systems:


Constitutional: No reports of fatigue, fever, or chills


Cardiovascular: No reports of chest pain or palpitations


Respiratory: No reports of shortness of breath or cough


GI: No reports of nausea, vomiting, reports passing gas and had a very scant 

amount of stool noted that was mucousy


: No reports of dysuria or retention


Neurovascular: reports of generalized weakness 





All medications have been reviewed





The rest of the 14-point review of systems is negative.





PHYSICAL EXAMINATION: 





GENERAL: The patient is alert and oriented x3, not in any acute distress. Well 

developed, well nourished.  Elderly appearing


HEENT: Pupils are round and equally reacting to light. EOMI. No scleral icterus.

No conjunctival pallor. Normocephalic, atraumatic. No pharyngeal erythema. No 

thyromegaly. 


CARDIOVASCULAR: S1 and S2 muffled


PULMONARY: Chest is clear to auscultation, no wheezing or crackles. 


ABDOMEN: Soft, tender, nondistended, bowel sounds noted. No palpable organom

egaly. 


MUSCULOSKELETAL: No joint swelling or deformity.


EXTREMITIES: No cyanosis, clubbing, or pedal edema. 


NEUROLOGICAL: Gross neurological examination did not reveal any focal deficits. 


SKIN: No rashes. 





Assessment:





Colon mass status post right hemicolectomy, postop day 2


History of coronary artery disease


Diabetes mellitus history, type II


Hyperlipidemia


GERD


Hypertension history


History of renal disease


History of AICD


Continued ongoing nicotine dependence


GI prophylaxis


DVT prophylaxis


Full code








Plan:





Patient is status post right hemicolectomy postop day 2 continues on epidural pa

in pump and indwelling Mccann catheter.  Recommend discontinuing the pain pump 

and initiating IV and oral narcotic for pain management as patient is reporting 

he feels the epidural is too strong causing some dizziness, lightheadedness, and

continued nausea.  Adjustments to medications made and patient will likely be 

discontinuing Mccann and pain pump on 6/15/2024


Continue with antinausea medications as needed and will add Reglan if okay with 

surgery as needed as well


Medications reviewed and resumed as appropriate.  Will resume Entresto starting 

tomorrow.  Cardiology has evaluated the patient


Encouraged to increase activity as tolerated as well as incentive spirometer use


Diet is being advanced per surgery to full liquids.  


Recommend repeat labs in the a.m. and will replace electrolytes per protocol.  

Monitor kidney functions as creatinine is currently 1.8


We will continue to follow with general surgery during hospitalization.  Thank 

you kindly for this consultation.





The impression and plan of care has been dictated by Basia Skinner, Nurse 

Practitioner as directed.





Dr. Kimber MD


I have performed a history and examination and MDM of this patient, discussed 

the same with the dictator, and  agree with the dictator's assessment and plan 

as written ,documented as a scribe. Based on total visit time,  I have performed

more than 50% of the visit.





Objective





- Vital Signs


Vital signs: 


                                   Vital Signs











Temp  98.2 F   06/14/24 14:45


 


Pulse  78   06/14/24 14:45


 


Resp  18   06/14/24 14:45


 


BP  153/84   06/14/24 14:45


 


Pulse Ox  96   06/14/24 14:45


 


FiO2      








                                 Intake & Output











 06/13/24 06/14/24 06/14/24





 18:59 06:59 18:59


 


Intake Total  235.267 


 


Output Total 


 


Balance -400 -14.733 -1650


 


Intake:   


 


  Intake, IV Titration  235.267 





  Amount   


 


    Ropivacaine 250 mg  235.267 





    Hydromorphone (Pf) 5 mg   





    In Sodium Chloride 0.9%   





    200 ml @ Per Protocol   





    EPIDURAL .Q0M PRN Rx#:   





    [id number]   


 


Output:   


 


  Urine 


 


    Uretheral (Mccann)  250 


 


Other:   


 


  Voiding Method Indwelling Catheter Indwelling Catheter Indwelling Catheter


 


  # Bowel Movements 1  














- Labs


CBC & Chem 7: 


                                 06/14/24 06:56





                                 06/14/24 06:56


Labs: 


                  Abnormal Lab Results - Last 24 Hours (Table)











  06/13/24 06/13/24 06/14/24 Range/Units





  17:08 20:45 05:41 


 


RBC     (4.40-5.60)  X 10*6/uL


 


MCV     (80.0-97.0)  FL


 


MCHC     (32.0-37.0)  g/dL


 


RDW     (11.5-14.5)  %


 


Creatinine     (0.6-1.5)  mg/dL


 


Est GFR (CKD-EPI)     (>=60)   


 


POC Glucose (mg/dL)  137 H  189 H  54 L  ()  mg/dL


 


Hemoglobin A1c     (<=6.0)  %


 


Calcium     (8.7-10.3)  mg/dL














  06/14/24 06/14/24 06/14/24 Range/Units





  06:09 06:43 06:56 


 


RBC     (4.40-5.60)  X 10*6/uL


 


MCV     (80.0-97.0)  FL


 


MCHC     (32.0-37.0)  g/dL


 


RDW     (11.5-14.5)  %


 


Creatinine     (0.6-1.5)  mg/dL


 


Est GFR (CKD-EPI)     (>=60)   


 


POC Glucose (mg/dL)  55 L  63 L   ()  mg/dL


 


Hemoglobin A1c    7.0 H  (<=6.0)  %


 


Calcium     (8.7-10.3)  mg/dL














  06/14/24 06/14/24 06/14/24 Range/Units





  06:56 06:56 11:56 


 


RBC  4.18 L    (4.40-5.60)  X 10*6/uL


 


MCV  97.6 H    (80.0-97.0)  FL


 


MCHC  31.9 L    (32.0-37.0)  g/dL


 


RDW  14.6 H    (11.5-14.5)  %


 


Creatinine   1.8 H   (0.6-1.5)  mg/dL


 


Est GFR (CKD-EPI)   42 L   (>=60)   


 


POC Glucose (mg/dL)    111 H  ()  mg/dL


 


Hemoglobin A1c     (<=6.0)  %


 


Calcium   8.3 L   (8.7-10.3)  mg/dL

## 2024-06-14 NOTE — P.PN
Progress Note - Text





Limited cardiac consult postsurgery


Patient was admitted for management of a colonic mass and he underwent colon 

surgery


Postoperatively he is doing well.  He is already on oral feeding now


He denies any chest discomfort or shortness of breath


On examination his heart sounds are regular


Blood pressure 138/76 mmHg pulse rate in the 80s afebrile





Impression


Recent colon surgery


Patient on heart failure medications


Follows with Dr. Saravia





Suggest


Continue current medications and resume cardiac medications over the next 24 maryanne

rs


Follow-up with Dr. Saravia as an outpatient

## 2024-06-14 NOTE — P.PN
Subjective


Progress Note Date: 06/14/24





CHIEF COMPLAINT: Transverse colon





HISTORY OF PRESENT ILLNESS: Patient is postop day #2 status post right 

hemicolectomy.  Pain is controlled.  Patient sitting up at side chair.  Denies 

any nausea or vomiting.  He did have a large liquidy stool.  Afebrile.  WBC 9.84

Hgb 13.0 platelets 225 creatinine 1.8





Patient seen and examined Dr. Argueta





PHYSICAL EXAM: 


VITAL SIGNS: Reviewed.


GENERAL: Well-developed in no acute distress. 


HEENT:  No sclera icterus. Extraocular movements grossly intact.  Moist buccal 

mucosa. Head is atraumatic, normocephalic. 


ABDOMEN:  Soft.  Nondistended.  Abdominal incisional dressing small area of 

blood saturation noted


NEUROLOGIC: Alert and oriented. Cranial nerves II through XII grossly intact.





ASSESSMENT: 


1.  Transverse colon polyp status post right hemicolectomy








PLAN: 


-Advance diet to full liquids


-Discontinue IV fluids


-Discontinue epidural and Mccann catheter tomorrow


-IV Dilaudid and Norco added for pain management starting tomorrow, when 

epidural discontinued


-Encourage patient to increase activity level


-Encourage patient to use incentive spirometer


-Follow-up on pathology results


-DVT prophylaxis subcu heparin and GI prophylaxis Protonix





Physician Assistant note has been reviewed by physician. Signing provider agrees

with the documented findings, assessment, and plan of care.





Objective





- Vital Signs


Vital signs: 


                                   Vital Signs











Temp  97.4 F L  06/14/24 07:16


 


Pulse  82   06/14/24 07:16


 


Resp  17   06/14/24 07:16


 


BP  131/72   06/14/24 07:16


 


Pulse Ox  97   06/14/24 07:16


 


FiO2      








                                 Intake & Output











 06/13/24 06/14/24 06/14/24





 18:59 06:59 18:59


 


Intake Total  235.267 


 


Output Total 400 250 


 


Balance -400 -14.733 


 


Intake:   


 


  Intake, IV Titration  235.267 





  Amount   


 


    Ropivacaine 250 mg  235.267 





    Hydromorphone (Pf) 5 mg   





    In Sodium Chloride 0.9%   





    200 ml @ Per Protocol   





    EPIDURAL .Q0M PRN Rx#:   





    [id number]   


 


Output:   


 


  Urine 400 250 


 


    Uretheral (Mccann)  250 


 


Other:   


 


  Voiding Method Indwelling Catheter Indwelling Catheter Indwelling Catheter


 


  # Bowel Movements 1  














- Labs


CBC & Chem 7: 


                                 06/14/24 06:56





                                 06/14/24 06:56


Labs: 


                  Abnormal Lab Results - Last 24 Hours (Table)











  06/13/24 06/13/24 06/13/24 Range/Units





  12:14 17:08 20:45 


 


RBC     (4.40-5.60)  X 10*6/uL


 


MCV     (80.0-97.0)  FL


 


MCHC     (32.0-37.0)  g/dL


 


RDW     (11.5-14.5)  %


 


Creatinine     (0.6-1.5)  mg/dL


 


Est GFR (CKD-EPI)     (>=60)   


 


POC Glucose (mg/dL)  161 H  137 H  189 H  ()  mg/dL


 


Hemoglobin A1c     (<=6.0)  %


 


Calcium     (8.7-10.3)  mg/dL














  06/14/24 06/14/24 06/14/24 Range/Units





  05:41 06:09 06:43 


 


RBC     (4.40-5.60)  X 10*6/uL


 


MCV     (80.0-97.0)  FL


 


MCHC     (32.0-37.0)  g/dL


 


RDW     (11.5-14.5)  %


 


Creatinine     (0.6-1.5)  mg/dL


 


Est GFR (CKD-EPI)     (>=60)   


 


POC Glucose (mg/dL)  54 L  55 L  63 L  ()  mg/dL


 


Hemoglobin A1c     (<=6.0)  %


 


Calcium     (8.7-10.3)  mg/dL














  06/14/24 06/14/24 06/14/24 Range/Units





  06:56 06:56 06:56 


 


RBC   4.18 L   (4.40-5.60)  X 10*6/uL


 


MCV   97.6 H   (80.0-97.0)  FL


 


MCHC   31.9 L   (32.0-37.0)  g/dL


 


RDW   14.6 H   (11.5-14.5)  %


 


Creatinine    1.8 H  (0.6-1.5)  mg/dL


 


Est GFR (CKD-EPI)    42 L  (>=60)   


 


POC Glucose (mg/dL)     ()  mg/dL


 


Hemoglobin A1c  7.0 H    (<=6.0)  %


 


Calcium    8.3 L  (8.7-10.3)  mg/dL

## 2024-06-14 NOTE — P.CONS
History of Present Illness





- Reason for Consult


Consult date: 06/13/24


Medical management, status post right hemicolectomy





- History of Present Illness











This is a pleasant 63-year-old male who was recently admitted under general 

surgery services status post right hemicolectomy.  On exam this morning patient 

continues to report nausea with difficulty tolerating clear liquid diet 

requesting advancing diet and will discuss with general surgery.  Minimal bowel 

sounds noted on exam and patient is reporting belching.  Patient continues with 

epidural with anesthesia following and he reports he feels being too much 

causing some nausea and uneasy feeling.  Will be discussed with general surgery 

along with anesthesia regarding lowering the dose or discontinuing.  Patient 

continues with indwelling Mccann catheter and will continue for now.  Patient 

reports he follows with Julia Mujica in the outpatient setting with a 

past medical history of coronary artery disease, diabetes mellitus, GERD, 

hyperlipidemia, hypertension, renal disease, AICD, and continued ongoing 

nicotine dependence.  Encourage patient to increase activity as tolerated and 

sit up out of the bed more often.  Recommend follow-up labs in the a.m. and also

encouraged continued incentive spirometer use at least 10 times every hour while

awake.  Home medications reviewed and resumed and will continue to monitor and 

follow closely.








REVIEW OF SYSTEMS: 


CONSTITUTIONAL: No fever, no malaise, no fatigue. 


HEENT: No recent visual problems or hearing problems. Denied any sore throat. 


CARDIOVASCULAR: No chest pain, orthopnea, PND, no palpitations, no syncope. 


PULMONARY: No shortness of breath, no cough, no hemoptysis. 


GASTROINTESTINAL: No diarrhea, reports some significant nausea, no vomiting, 

reports abdominal pain. 


NEUROLOGICAL: No headaches, no weakness, no numbness. 


HEMATOLOGICAL: Denies any bleeding or petechiae. 


GENITOURINARY: Denies any burning micturition, frequency, or urgency. 


MUSCULOSKELETAL/RHEUMATOLOGICAL: Denies any joint pain, swelling, or any muscle 

pain. 


ENDOCRINE: Denies any polyuria or polydipsia. 





The rest of the 14-point review of systems is negative.





PHYSICAL EXAMINATION: 





GENERAL: The patient is alert and oriented x3, not in any acute distress. Well 

developed, well nourished.  Elderly appearing


HEENT: Pupils are round and equally reacting to light. EOMI. No scleral icterus.

No conjunctival pallor. Normocephalic, atraumatic. No pharyngeal erythema. No 

thyromegaly. 


CARDIOVASCULAR: S1 and S2 muffled


PULMONARY: Chest is clear to auscultation, no wheezing or crackles. 


ABDOMEN: Soft, tender, nondistended, hypoactive bowel sounds. No palpable 

organomegaly. 


MUSCULOSKELETAL: No joint swelling or deformity.


EXTREMITIES: No cyanosis, clubbing, or pedal edema. 


NEUROLOGICAL: Gross neurological examination did not reveal any focal deficits. 


SKIN: No rashes. 





Assessment:





Colon mass status post right hemicolectomy, postop day 1


History of coronary artery disease


Diabetes mellitus history, type II


Hyperlipidemia


GERD


Hypertension history


History of renal disease


History of AICD


Continued ongoing nicotine dependence


GI prophylaxis


DVT prophylaxis


Full code








Plan:





Patient is status post right hemicolectomy postop day 1 continues on epidural 

pain pump and chronic Mccann catheter.  Recommend discontinuing the pain pump and

initiating IV and oral narcotic for pain management as patient is reporting he 

feels the epidural is too strong causing some dizziness, lightheadedness, and 

continued nausea


Continue with antinausea medications as needed and will add Reglan if okay with 

surgery as needed as well


Medications reviewed and resumed as appropriate


Encouraged to increase activity as tolerated as well as incentive spirometer use


Continue clear liquid diet per surgery and advance per surgery.  Patient is not 

tolerating much of clear liquids reports it is making him feel more nauseated 

and would like an advance.  Discussed with the patient along with mother at the 

bedside to make surgery aware and ask them for advancing diet.


Recommend repeat labs in the a.m. and will replace electrolytes per protocol


We will continue to follow with general surgery during hospitalization.  Thank 

you kindly for this consultation.





The impression and plan of care has been dictated by Basia Skinner, Nurse 

Practitioner as directed.





Dr. Kimber MD


I have performed a history and examination and MDM of this patient, discussed 

the same with the dictator, and  agree with the dictator's assessment and plan 

as written ,documented as a scribe. Based on total visit time,  I have performed

more than 50% of the visit.














Past Medical History


Past Medical History: Coronary Artery Disease (CAD), Diabetes Mellitus, 

GERD/Reflux, Hyperlipidemia, Hypertension, Renal Disease


Additional Past Medical History / Comment(s): SEE DR CARSON'S H&P, Blockage

in L kidney. Hx. of detached retina. Recent Iron infusions x 3 at [facility] approx 2 weeks. kidney stone.  polyps


History of Any Multi-Drug Resistant Organisms: None Reported


Past Surgical History: AICD, Appendectomy, Back Surgery, Cholecystectomy, 

Coronary Bypass/CABG, Heart Catheterization


Additional Past Surgical History / Comment(s): CABG X5,.  LEFT EYE RETINAL SX 

X2.  RT CATARACT SX glaucoma surgery


Past Anesthesia/Blood Transfusion Reactions: Postoperative Nausea & Vomiting 

(PONV)


Type of Cardiac Device: AICD


Device Placement Date:: 2016


Smoking Status: Current every day smoker





- Past Family History


  ** Father


Family Medical History: Cancer


Additional Family Medical History / Comment(s): PROSTATE





Medications and Allergies


                                Home Medications











 Medication  Instructions  Recorded  Confirmed  Type


 


Aspirin [Adult Low Dose Aspirin EC] 81 mg PO QAM 05/31/16 06/12/24 History


 


Metoprolol Tartrate [Lopressor] 25 mg PO BID 05/31/16 06/12/24 History


 


prednisoLONE ACETATE 1% OPHTH 1 drop RIGHT EYE AS DIRECTED 05/31/16 06/12/24 

History





[Pred Forte 1%]    


 


Atorvastatin [Lipitor] 80 mg PO HS 07/10/17 06/12/24 History


 


Ferrous Sulfate [Feosol] 325 mg PO DAILY 04/27/21 06/12/24 History


 


calcitrioL 0.25 mcg PO AS DIRECTED 04/27/21 06/12/24 History


 


Empagliflozin [Jardiance] 10 mg PO DAILY 05/21/24 06/12/24 History


 


Insulin Glargine,Hum.rec.anlog 24 units INJ HS 05/21/24 06/12/24 History





[Lantus Solostar Pen]    


 


Magnesium Oxide [Mag-Ox] 400 mg PO Q48H 05/21/24 06/12/24 History


 


Sacubitril/Valsartan [Entresto 24 1 tab PO BID 05/21/24 06/12/24 History





mg-26 mg Tablet]    


 


Vit C(Unk) 500 mg PO DAILY 05/21/24 06/12/24 History


 


Vit D2(Unk) 1.25 mg PO Q30D 05/21/24 06/12/24 History


 


allopurinoL 100 mg PO DAILY 05/21/24 06/12/24 History


 


glipiZIDE 5 mg PO DAILY 05/21/24 06/12/24 History








                                    Allergies











Allergy/AdvReac Type Severity Reaction Status Date / Time


 


No Known Allergies Allergy   Verified 06/12/24 07:45














Physical Exam


Vitals: 


                                   Vital Signs











  Temp Pulse Pulse Resp BP Pulse Ox


 


 06/13/24 07:44  97.6 F  92   19  110/62  97


 


 06/13/24 00:54  97.7 F  96   16  107/64  96


 


 06/12/24 19:07  97.8 F  86   16  111/70  99


 


 06/12/24 16:11       97


 


 06/12/24 14:30    64  16  118/72  98


 


 06/12/24 14:00    59 L  16  116/63  97


 


 06/12/24 13:30    66  16  114/62  99


 


 06/12/24 13:00    65  16  109/57  98


 


 06/12/24 12:30    60  15  109/57  99


 


 06/12/24 12:00    56 L  16  107/58  98


 


 06/12/24 11:45    57 L  16  112/64  99








                                Intake and Output











 06/12/24 06/13/24 06/13/24





 22:59 06:59 14:59


 


Output Total  575 


 


Balance  -575 


 


Output:   


 


  Urine  575 


 


    Uretheral (Mccann)  575 


 


Other:   


 


  Voiding Method Indwelling Catheter  Indwelling Catheter














Results


CBC & Chem 7: 


                                 06/13/24 07:42





Labs: 


                  Abnormal Lab Results - Last 24 Hours (Table)











  06/12/24 06/12/24 06/13/24 Range/Units





  14:10 19:58 05:41 


 


WBC     (3.8-10.6)  k/uL


 


RBC     (4.30-5.90)  m/uL


 


Hgb     (13.0-17.5)  gm/dL


 


MCV     (80.0-100.0)  fL


 


Neutrophils #     (1.3-7.7)  k/uL


 


Lymphocytes #     (1.0-4.8)  k/uL


 


POC Glucose (mg/dL)  168 H  170 H  160 H  ()  mg/dL














  06/13/24 Range/Units





  07:42 


 


WBC  11.1 H  (3.8-10.6)  k/uL


 


RBC  4.08 L  (4.30-5.90)  m/uL


 


Hgb  12.8 L  (13.0-17.5)  gm/dL


 


MCV  101.3 H  (80.0-100.0)  fL


 


Neutrophils #  9.2 H  (1.3-7.7)  k/uL


 


Lymphocytes #  0.8 L  (1.0-4.8)  k/uL


 


POC Glucose (mg/dL)   ()  mg/dL

## 2024-06-15 LAB
ANION GAP SERPL CALC-SCNC: 9.7 MMOL/L (ref 4–12)
BASOPHILS # BLD AUTO: 0.07 X 10*3/UL (ref 0–0.1)
BASOPHILS NFR BLD AUTO: 0.9 %
BUN SERPL-SCNC: 16 MG/DL (ref 9–27)
BUN/CREAT SERPL: 11.43 RATIO (ref 12–20)
CALCIUM SPEC-MCNC: 8.1 MG/DL (ref 8.7–10.3)
CHLORIDE SERPL-SCNC: 111 MMOL/L (ref 96–109)
CO2 SERPL-SCNC: 22.3 MMOL/L (ref 21.6–31.8)
EOSINOPHIL # BLD AUTO: 0.38 X 10*3/UL (ref 0.04–0.35)
EOSINOPHIL NFR BLD AUTO: 4.7 %
ERYTHROCYTE [DISTWIDTH] IN BLOOD BY AUTOMATED COUNT: 3.87 X 10*6/UL (ref 4.4–5.6)
ERYTHROCYTE [DISTWIDTH] IN BLOOD: 14.6 % (ref 11.5–14.5)
GLUCOSE BLD-MCNC: 105 MG/DL (ref 70–110)
GLUCOSE BLD-MCNC: 125 MG/DL (ref 70–110)
GLUCOSE BLD-MCNC: 125 MG/DL (ref 70–110)
GLUCOSE BLD-MCNC: 160 MG/DL (ref 70–110)
GLUCOSE BLD-MCNC: 71 MG/DL (ref 70–110)
GLUCOSE SERPL-MCNC: 67 MG/DL (ref 70–110)
HCT VFR BLD AUTO: 37.8 % (ref 39.6–50)
HGB BLD-MCNC: 11.7 G/DL (ref 13–17)
IMM GRANULOCYTES BLD QL AUTO: 0.2 %
LYMPHOCYTES # SPEC AUTO: 2.08 X 10*3/UL (ref 0.9–5)
LYMPHOCYTES NFR SPEC AUTO: 25.9 %
MCH RBC QN AUTO: 30.2 PG (ref 27–32)
MCHC RBC AUTO-ENTMCNC: 31 G/DL (ref 32–37)
MCV RBC AUTO: 97.7 FL (ref 80–97)
MONOCYTES # BLD AUTO: 1.06 X 10*3/UL (ref 0.2–1)
MONOCYTES NFR BLD AUTO: 13.2 %
NEUTROPHILS # BLD AUTO: 4.43 X 10*3/UL (ref 1.8–7.7)
NEUTROPHILS NFR BLD AUTO: 55.1 %
NRBC BLD AUTO-RTO: 0 X 10*3/UL (ref 0–0.01)
PLATELET # BLD AUTO: 227 X 10*3/UL (ref 140–440)
POTASSIUM SERPL-SCNC: 4.1 MMOL/L (ref 3.5–5.5)
SODIUM SERPL-SCNC: 143 MMOL/L (ref 135–145)
WBC # BLD AUTO: 8.04 X 10*3/UL (ref 4.5–10)

## 2024-06-15 RX ADMIN — ERGOCALCIFEROL SCH MCG: 1.25 CAPSULE ORAL at 10:06

## 2024-06-15 NOTE — P.PN
Progress Note - Text


Progress Note Date: 06/15/24





CHIEF COMPLAINT: Colon Polyp





HISTORY OF PRESENT ILLNESS: Patient is postop day #3 status post right 

hemicolectomy.  Pain is controlled.  Patient sitting up at side chair.  Denies 

any nausea or vomiting.  Having BMs.  Afebrile. 





PHYSICAL EXAM: 


VITAL SIGNS: Reviewed.


GENERAL: Well-developed in no acute distress. 


HEENT:  No sclera icterus. Extraocular movements grossly intact.  Moist buccal 

mucosa. Head is atraumatic, normocephalic. 


ABDOMEN:  Soft.  Nondistended.  Abdominal incisional dressing small area of 

blood saturation noted


NEUROLOGIC: Alert and oriented. Cranial nerves II through XII grossly intact.





ASSESSMENT: 


1.  Transverse colon polyp status post right hemicolectomy








PLAN: 


-Advance diet to Soft Diet


-Discontinue epidural and Mccann catheter this AM


-IV Dilaudid and Norco for pain


-Encourage patient to increase activity level


-Encourage patient to use incentive spirometer


-Follow-up on pathology results


-DVT prophylaxis subcu heparin and GI prophylaxis Protonix

## 2024-06-15 NOTE — P.PN
Progress Note - Text





6/15/24  1519


63-year-old male status post transverse colectomy.  Patient had an epidural 

catheter for postop pain control, epidural catheter was discontinued as per the 

surgeon.  Patient seen and evaluated doing well with a VAS of 2 no motor or 

sensory deficit noted ambulating well

## 2024-06-16 VITALS — RESPIRATION RATE: 18 BRPM | DIASTOLIC BLOOD PRESSURE: 84 MMHG | SYSTOLIC BLOOD PRESSURE: 148 MMHG | TEMPERATURE: 98.1 F

## 2024-06-16 VITALS — HEART RATE: 85 BPM

## 2024-06-16 LAB
GLUCOSE BLD-MCNC: 121 MG/DL (ref 70–110)
GLUCOSE BLD-MCNC: 177 MG/DL (ref 70–110)
GLUCOSE BLD-MCNC: 75 MG/DL (ref 70–110)

## 2024-06-16 NOTE — P.DS
Providers


Date of admission: 


06/12/24 07:15





Expected date of discharge: 06/16/24


Attending physician: 


Yadiel Argueta





Consults: 





                                        





06/12/24 10:43


Consult Physician Routine 


   Consulting Provider: Spencer Núñez


   Consult Reason/Comments: medical management


   Do you want consulting provider notified?: Yes











Primary care physician: 


Chico Mayo





Hospital Course: 





DISCHARGE DIAGNOSES: 


1.  Transverse colon polyp


2.  Diabetes type 2, insulin-dependent


3.  Hyperlipidemia


4.  Iron deficiency anemia


5.  Gout


6.  Hypertensive heart disease





HOSPITAL COURSE: The patient is a 63-year-old male admitted for colon polyp.  He

is tolerating regular diet.  He had flatus and bowel movements.  Pain very well-

controlled.  He is voiding spontaneously his epidural has been discontinued.  He

is sitting up at bedside.





ROS: No reports of nausea and vomiting.  No fevers or chills.  No new chest 

pain.  No productive sputum





PHYSICAL EXAM: 


VITAL SIGNS: Reviewed


CONSTITUTIONAL:  Well developed and in no acute distress. 


EYES:  Conjuctivae without sclera icterus. Extraocular movements grossly intact.




HEAD, EARS, NOSE, THROAT: Moist buccal mucosa. Head is atraumatic, 

normocephalic. Hears conversational speech. No nasal drainage.


RESPIRATORY:  Non-labored respirations and equal bilateral excursions.


CARDIOVASCULAR:  Palpable 2+ radial pulses.  


ABDOMEN: No peritonitis.  Incision intact.


MUSCULOSKELETAL:  No gross deformity of the lower extremities noted.  No 

clubbing.  No cyanosis.


SKIN: Good skin turgor. Well perfused. 


NEUROLOGIC: Cranial nerves II through XII grossly intact.  No focal or late

ralizing signs. 


PSYCH:  Appropriate affect.  Alert and oriented to person, place and time. 





CLINICAL LABS: Reviewed.  WBC normal.





PATHOLOGY: Pending at the time of this dictation.





ASSESSMENT: 


1.  Transverse colon polyp





PLAN: 


1.  Clinically he has done well and may be discharged with follow-up with his 

primary care provider within 3 days of discharge


2.  Follow-up with index surgeon as outpatient


3.  Low fiber diet on discharge








                                   Vital Signs











Temp  98.1 F   06/16/24 07:08


 


Pulse  85   06/16/24 07:08


 


Resp  18   06/16/24 07:08


 


BP  148/84   06/16/24 07:08


 


Pulse Ox  93 L  06/16/24 07:08


 


FiO2      








                                 Intake & Output











 06/15/24 06/16/24 06/16/24





 18:59 06:59 18:59


 


Output Total 1700 1175 


 


Balance -1700 -1175 


 


Output:   


 


  Urine 1700 1175 


 


    Uretheral (Mccann) 1300  


 


Other:   


 


  Voiding Method Indwelling Catheter Toilet Toilet


 


  # Voids 2  


 


  # Bowel Movements 1  











                       Laboratory Results - Last 24 Hours











  06/15/24 06/15/24 06/16/24





  16:39 20:03 01:52


 


POC Glucose (mg/dL)  125 H  160 H  121 H


 


POC Glu Operater ID  Milan Rodgers Nora Dean, Katelyn














  06/16/24 06/16/24





  05:39 11:24


 


POC Glucose (mg/dL)  75  177 H


 


POC Glu Operater ID  Gerard Rodgesr Leah








Patient Condition at Discharge: Good





Plan - Discharge Summary


Discharge Rx Participant: No


New Discharge Prescriptions: 


New


   HYDROcodone/APAP 5-325MG [Norco 5-325] 1 tab PO Q6HR PRN 3 Days #12 tab


     PRN Reason: Pain





Continue


   Metoprolol Tartrate [Lopressor] 25 mg PO BID


   prednisoLONE ACETATE 1% OPHTH [Pred Forte 1%] 1 drop RIGHT EYE AS DIRECTED


   Aspirin [Adult Low Dose Aspirin EC] 81 mg PO QAM


   Atorvastatin [Lipitor] 80 mg PO HS


   calcitrioL 0.25 mcg PO AS DIRECTED


   allopurinoL 100 mg PO DAILY


   Empagliflozin [Jardiance] 10 mg PO DAILY


   Ferrous Sulfate [Iron (65 MG Elemental)] 325 mg PO DAILY


   Magnesium Oxide [Mag-Ox] 400 mg PO Q48H


   glipiZIDE 5 mg PO DAILY


   Vit D2(Unk) 1.25 mg PO Q30D


   Vit C(Unk) 500 mg PO DAILY


   Sacubitril/Valsartan [Entresto 24 mg-26 mg Tablet] 1 tab PO BID


   Insulin Glargine,Hum.rec.anlog [Lantus Solostar Pen] 24 units INJ HS


Discharge Medication List





Aspirin [Adult Low Dose Aspirin EC] 81 mg PO QAM 05/31/16 [History]


Metoprolol Tartrate [Lopressor] 25 mg PO BID 05/31/16 [History]


prednisoLONE ACETATE 1% OPHTH [Pred Forte 1%] 1 drop RIGHT EYE AS DIRECTED 

05/31/16 [History]


Atorvastatin [Lipitor] 80 mg PO HS 07/10/17 [History]


Ferrous Sulfate [Iron (65 MG Elemental)] 325 mg PO DAILY 04/27/21 [History]


calcitrioL 0.25 mcg PO AS DIRECTED 04/27/21 [History]


Empagliflozin [Jardiance] 10 mg PO DAILY 05/21/24 [History]


Insulin Glargine,Hum.rec.anlog [Lantus Solostar Pen] 24 units INJ HS 05/21/24 

[History]


Magnesium Oxide [Mag-Ox] 400 mg PO Q48H 05/21/24 [History]


Sacubitril/Valsartan [Entresto 24 mg-26 mg Tablet] 1 tab PO BID 05/21/24 

[History]


Vit C(Unk) 500 mg PO DAILY 05/21/24 [History]


Vit D2(Unk) 1.25 mg PO Q30D 05/21/24 [History]


allopurinoL 100 mg PO DAILY 05/21/24 [History]


glipiZIDE 5 mg PO DAILY 05/21/24 [History]


HYDROcodone/APAP 5-325MG [Norco 5-325] 1 tab PO Q6HR PRN 3 Days #12 tab 06/14/24

[Rx]








Follow up Appointment(s)/Referral(s): 


[facility] Visiting Nurse, [NON-STAFF] - As Needed


Yadiel Argueta MD [STAFF PHYSICIAN] - 1 Week


Patient Instructions/Handouts:  Colectomy Diet (DC), Low Fiber Diet (DC)


Activity/Diet/Wound Care/Special Instructions: 


No driving while taking Norco


No lifting over 10 pounds


Shower daily. No soaking or tub baths for 2 weeks


Very light activity until you are reevaluated at your follow up appointment with

your surgeon


Discharge Disposition: HOME SELF-CARE

## 2024-09-16 ENCOUNTER — HOSPITAL ENCOUNTER (OUTPATIENT)
Dept: HOSPITAL 47 - LABPAT | Age: 64
Discharge: HOME | End: 2024-09-16
Attending: INTERNAL MEDICINE
Payer: COMMERCIAL

## 2024-09-16 LAB
ANION GAP SERPL CALC-SCNC: 9.8 MMOL/L (ref 4–12)
BUN SERPL-SCNC: 28.7 MG/DL (ref 9–27)
CHLORIDE SERPL-SCNC: 104 MMOL/L (ref 96–109)
CO2 SERPL-SCNC: 25.2 MMOL/L (ref 21.6–31.8)
ERYTHROCYTE [DISTWIDTH] IN BLOOD BY AUTOMATED COUNT: 4.47 X 10*6/UL (ref 4.4–5.6)
ERYTHROCYTE [DISTWIDTH] IN BLOOD: 14 % (ref 11.5–14.5)
HCT VFR BLD AUTO: 44.4 % (ref 39.6–50)
HGB BLD-MCNC: 13.7 G/DL (ref 13–17)
MCH RBC QN AUTO: 30.6 PG (ref 27–32)
MCHC RBC AUTO-ENTMCNC: 30.9 G/DL (ref 32–37)
MCV RBC AUTO: 99.3 FL (ref 80–97)
NRBC BLD AUTO-RTO: 0 X 10*3/UL (ref 0–0.01)
PLATELET # BLD AUTO: 264 X 10*3/UL (ref 140–440)
POTASSIUM SERPL-SCNC: 4.6 MMOL/L (ref 3.5–5.5)
SODIUM SERPL-SCNC: 139 MMOL/L (ref 135–145)
WBC # BLD AUTO: 7.29 X 10*3/UL (ref 4.5–10)

## 2024-09-16 PROCEDURE — 85027 COMPLETE CBC AUTOMATED: CPT

## 2024-09-16 PROCEDURE — 36415 COLL VENOUS BLD VENIPUNCTURE: CPT

## 2024-09-16 PROCEDURE — 84520 ASSAY OF UREA NITROGEN: CPT

## 2024-09-16 PROCEDURE — 82565 ASSAY OF CREATININE: CPT

## 2024-09-16 PROCEDURE — 80051 ELECTROLYTE PANEL: CPT

## 2024-09-24 ENCOUNTER — HOSPITAL ENCOUNTER (OUTPATIENT)
Dept: HOSPITAL 47 - CATHEP | Age: 64
LOS: 1 days | Discharge: HOME | End: 2024-09-25
Attending: INTERNAL MEDICINE
Payer: COMMERCIAL

## 2024-09-24 LAB
ANION GAP SERPL CALC-SCNC: 5 MMOL/L
BASOPHILS # BLD AUTO: 0.1 K/UL (ref 0–0.2)
BASOPHILS NFR BLD AUTO: 1 %
BUN SERPL-SCNC: 34 MG/DL (ref 9–20)
CALCIUM SPEC-MCNC: 8.9 MG/DL (ref 8.4–10.2)
CHLORIDE SERPL-SCNC: 108 MMOL/L (ref 98–107)
CO2 SERPL-SCNC: 26 MMOL/L (ref 22–30)
EOSINOPHIL # BLD AUTO: 0.6 K/UL (ref 0–0.7)
EOSINOPHIL NFR BLD AUTO: 7 %
ERYTHROCYTE [DISTWIDTH] IN BLOOD BY AUTOMATED COUNT: 4.51 M/UL (ref 4.3–5.9)
ERYTHROCYTE [DISTWIDTH] IN BLOOD: 13.7 % (ref 11.5–15.5)
GLUCOSE BLD-MCNC: 102 MG/DL (ref 70–110)
GLUCOSE BLD-MCNC: 105 MG/DL (ref 70–110)
GLUCOSE BLD-MCNC: 107 MG/DL (ref 70–110)
GLUCOSE BLD-MCNC: 71 MG/DL (ref 70–110)
GLUCOSE BLD-MCNC: 77 MG/DL (ref 70–110)
GLUCOSE SERPL-MCNC: 111 MG/DL (ref 74–99)
HCT VFR BLD AUTO: 44.6 % (ref 39–53)
HGB BLD-MCNC: 14.1 GM/DL (ref 13–17.5)
LYMPHOCYTES # SPEC AUTO: 1.4 K/UL (ref 1–4.8)
LYMPHOCYTES NFR SPEC AUTO: 17 %
MCH RBC QN AUTO: 31.3 PG (ref 25–35)
MCHC RBC AUTO-ENTMCNC: 31.7 G/DL (ref 31–37)
MCV RBC AUTO: 98.9 FL (ref 80–100)
MONOCYTES # BLD AUTO: 0.6 K/UL (ref 0–1)
MONOCYTES NFR BLD AUTO: 8 %
NEUTROPHILS # BLD AUTO: 5.3 K/UL (ref 1.3–7.7)
NEUTROPHILS NFR BLD AUTO: 65 %
PLATELET # BLD AUTO: 243 K/UL (ref 150–450)
POTASSIUM SERPL-SCNC: 5.2 MMOL/L (ref 3.5–5.1)
SODIUM SERPL-SCNC: 139 MMOL/L (ref 137–145)
WBC # BLD AUTO: 8.2 K/UL (ref 3.8–10.6)

## 2024-09-24 PROCEDURE — 86850 RBC ANTIBODY SCREEN: CPT

## 2024-09-24 PROCEDURE — 86901 BLOOD TYPING SEROLOGIC RH(D): CPT

## 2024-09-24 PROCEDURE — 86900 BLOOD TYPING SEROLOGIC ABO: CPT

## 2024-09-24 PROCEDURE — 80048 BASIC METABOLIC PNL TOTAL CA: CPT

## 2024-09-24 PROCEDURE — 33262 RMVL& REPLC PULSE GEN 1 LEAD: CPT

## 2024-09-24 PROCEDURE — 93641 EP EVL 1/2CHMB PAC CVDFB TST: CPT

## 2024-09-24 PROCEDURE — 85025 COMPLETE CBC W/AUTO DIFF WBC: CPT

## 2024-09-24 RX ADMIN — ATORVASTATIN CALCIUM SCH MG: 80 TABLET, FILM COATED ORAL at 21:11

## 2024-09-24 RX ADMIN — CEFAZOLIN SCH MLS/HR: 330 INJECTION, POWDER, FOR SOLUTION INTRAMUSCULAR; INTRAVENOUS at 12:00

## 2024-09-24 RX ADMIN — SODIUM CHLORIDE STA MLS/HR: 9 INJECTION, SOLUTION INTRAVENOUS at 18:16

## 2024-09-24 RX ADMIN — SODIUM CHLORIDE ONE MG: 9 INJECTION, SOLUTION INTRAVENOUS at 19:14

## 2024-09-24 RX ADMIN — CEFAZOLIN PRN MLS: 330 INJECTION, POWDER, FOR SOLUTION INTRAMUSCULAR; INTRAVENOUS at 19:06

## 2024-09-24 RX ADMIN — POTASSIUM CHLORIDE ONE MLS: 14.9 INJECTION, SOLUTION INTRAVENOUS at 18:37

## 2024-09-24 RX ADMIN — ACETAMINOPHEN ONE: 10 INJECTION, SOLUTION INTRAVENOUS at 21:03

## 2024-09-24 RX ADMIN — LIDOCAINE HYDROCHLORIDE ONE ML: 10 INJECTION, SOLUTION INFILTRATION; PERINEURAL at 19:12

## 2024-09-24 RX ADMIN — SACUBITRIL AND VALSARTAN SCH EACH: 24; 26 TABLET, FILM COATED ORAL at 21:11

## 2024-09-24 RX ADMIN — INSULIN DETEMIR SCH: 100 INJECTION, SOLUTION SUBCUTANEOUS at 22:38

## 2024-09-24 RX ADMIN — SODIUM CHLORIDE, PRESERVATIVE FREE SCH ML: 5 INJECTION INTRAVENOUS at 21:11

## 2024-09-24 RX ADMIN — DEXTROSE MONOHYDRATE STA ML: 25 INJECTION, SOLUTION INTRAVENOUS at 16:20

## 2024-09-24 NOTE — P.EPPROC
- EP Procedure Note


Electrophysiology Procedure Note: 








Diagnosis


Ischemic cardiomyopathy, chronic


Left ventricular ejection fraction 40%, large inferior lateral MI, old


Coronary disease multivessel coronary artery bypass grafting


Congestive heart failure, systolic class II


On guideline directed medical treatment guideline directed single-chamber ICD 

implanted in 2016, high DFTs at that time


Premature battery depletion, device under warranty with [employer]


Device under warranty with [employer]





Procedure: Single ICD generator change for  management of risk of sudden cardiac

death 





Electrophysiologist: Dr. Webster





Result: Single chamber ICD generator change, [employer]





Chronic RV ICD lead: [employer] lead


R waves 9.2 mV, threshold 1 V at 0.4 ms and pacing pins of 388 ohms shock 

impedance 57 ohms





The old generator was explanted model number  and serial #[id number] on 

warranty, premature battery depletion





New ICD generator: Model number , Vigilant EL ICD DF 4 VR, serial #[id number]





Procedure details: Patient was brought to the EP lab in a fasting state.  

Written informed consent was obtained prior to the procedure.  Options, pros and

cons, benefits and risks and complications discussed with patient in detail 

prior to the procedure (shared decision making document, from [facility]).  

Importance of continuing medical treatment emphasized.  Alternatives discussed. 

Patient would like to proceed with ICD implant.


The left pectoral area was prepped and draped as a protocol.  IV antibiotics 

administered  1% lidocaine was used for local anesthesia.  A 4 cm incision was 

made parallel to the deltopectoral groove, about 1.5 cm medial to it.  The 

incision was carried down to the level of the pectoralis muscle and the 

subfascial pocket was made.  Hemostasis was assured.  


Leads connected to the ICD generator.  Wound closed in 3 layers and dressed per 

protocol


ICD was interrogated and programmed.  Appropriate pacing parameters, 

antitachycardia therapies with antitachycardia pacing cardioversion 

defibrillations programmed 


MADIT RIT programming sensitivity programmed at 0.6 mV, pacing mode VVI 40 beats

a minute


Patient tolerated the procedure well without any acute complications.  See 

scanned device report in EMR for lead details





Defibrillation level testing


Shock T wave protocol was used to induce ventricular fibrillation.  This was 

adequately and appropriately detected at least sensitivity and successfully 

internally defibrillated with a 21 J shock in reverse polarity


Charge time 3.8 seconds shock impedance 21 J, shock impedance 57 ohms at lead 

sensitivity

## 2024-09-25 VITALS
HEART RATE: 82 BPM | RESPIRATION RATE: 16 BRPM | TEMPERATURE: 97.8 F | DIASTOLIC BLOOD PRESSURE: 65 MMHG | SYSTOLIC BLOOD PRESSURE: 104 MMHG

## 2024-09-25 LAB
GLUCOSE BLD-MCNC: 100 MG/DL (ref 70–110)
GLUCOSE BLD-MCNC: 119 MG/DL (ref 70–110)

## 2024-09-25 RX ADMIN — ASPIRIN 81 MG CHEWABLE TABLET SCH MG: 81 TABLET CHEWABLE at 09:11

## 2024-09-25 RX ADMIN — CEFAZOLIN SCH: 330 INJECTION, POWDER, FOR SOLUTION INTRAMUSCULAR; INTRAVENOUS at 04:48

## 2024-09-25 RX ADMIN — DAPAGLIFLOZIN SCH MG: 5 TABLET, FILM COATED ORAL at 09:11

## 2024-09-25 RX ADMIN — Medication SCH MG: at 09:11

## 2024-11-20 ENCOUNTER — HOSPITAL ENCOUNTER (OUTPATIENT)
Dept: HOSPITAL 47 - LABPAT | Age: 64
Discharge: HOME | End: 2024-11-20
Payer: COMMERCIAL

## 2024-11-20 DIAGNOSIS — K43.2: ICD-10-CM

## 2024-11-20 DIAGNOSIS — Z01.812: Primary | ICD-10-CM

## 2024-11-20 LAB
BASOPHILS # BLD AUTO: 0.09 X 10*3/UL (ref 0–0.1)
BASOPHILS NFR BLD AUTO: 1.1 %
EOSINOPHIL # BLD AUTO: 0.44 X 10*3/UL (ref 0.04–0.35)
EOSINOPHIL NFR BLD AUTO: 5.5 %
ERYTHROCYTE [DISTWIDTH] IN BLOOD BY AUTOMATED COUNT: 4.64 X 10*6/UL (ref 4.4–5.6)
ERYTHROCYTE [DISTWIDTH] IN BLOOD: 14.3 % (ref 11.5–14.5)
HCT VFR BLD AUTO: 44.9 % (ref 39.6–50)
HGB BLD-MCNC: 13.9 G/DL (ref 13–17)
IMM GRANULOCYTES BLD QL AUTO: 0.2 %
LYMPHOCYTES # SPEC AUTO: 1.82 X 10*3/UL (ref 0.9–5)
LYMPHOCYTES NFR SPEC AUTO: 22.7 %
MCH RBC QN AUTO: 30 PG (ref 27–32)
MCHC RBC AUTO-ENTMCNC: 31 G/DL (ref 32–37)
MCV RBC AUTO: 96.8 FL (ref 80–97)
MONOCYTES # BLD AUTO: 0.86 X 10*3/UL (ref 0.2–1)
MONOCYTES NFR BLD AUTO: 10.7 %
NEUTROPHILS # BLD AUTO: 4.8 X 10*3/UL (ref 1.8–7.7)
NEUTROPHILS NFR BLD AUTO: 59.8 %
NRBC BLD AUTO-RTO: 0 X 10*3/UL (ref 0–0.01)
PLATELET # BLD AUTO: 262 X 10*3/UL (ref 140–440)
WBC # BLD AUTO: 8.03 X 10*3/UL (ref 4.5–10)

## 2024-11-20 PROCEDURE — 36415 COLL VENOUS BLD VENIPUNCTURE: CPT

## 2024-11-20 PROCEDURE — 86900 BLOOD TYPING SEROLOGIC ABO: CPT

## 2024-11-20 PROCEDURE — 86850 RBC ANTIBODY SCREEN: CPT

## 2024-11-20 PROCEDURE — 86901 BLOOD TYPING SEROLOGIC RH(D): CPT

## 2024-11-20 PROCEDURE — 85025 COMPLETE CBC W/AUTO DIFF WBC: CPT

## 2024-11-27 ENCOUNTER — HOSPITAL ENCOUNTER (OUTPATIENT)
Dept: HOSPITAL 47 - OR | Age: 64
LOS: 1 days | Discharge: HOME | End: 2024-11-28
Payer: COMMERCIAL

## 2024-11-27 VITALS — BODY MASS INDEX: 29.4 KG/M2

## 2024-11-27 DIAGNOSIS — Z79.82: ICD-10-CM

## 2024-11-27 DIAGNOSIS — Z79.84: ICD-10-CM

## 2024-11-27 DIAGNOSIS — E78.5: ICD-10-CM

## 2024-11-27 DIAGNOSIS — I10: ICD-10-CM

## 2024-11-27 DIAGNOSIS — Z95.810: ICD-10-CM

## 2024-11-27 DIAGNOSIS — I25.10: ICD-10-CM

## 2024-11-27 DIAGNOSIS — G89.18: ICD-10-CM

## 2024-11-27 DIAGNOSIS — Z79.4: ICD-10-CM

## 2024-11-27 DIAGNOSIS — Z90.49: ICD-10-CM

## 2024-11-27 DIAGNOSIS — Z95.1: ICD-10-CM

## 2024-11-27 DIAGNOSIS — K59.09: ICD-10-CM

## 2024-11-27 DIAGNOSIS — F17.200: ICD-10-CM

## 2024-11-27 DIAGNOSIS — E11.9: ICD-10-CM

## 2024-11-27 DIAGNOSIS — Z87.19: ICD-10-CM

## 2024-11-27 DIAGNOSIS — K21.9: ICD-10-CM

## 2024-11-27 DIAGNOSIS — K43.2: Primary | ICD-10-CM

## 2024-11-27 DIAGNOSIS — Z79.899: ICD-10-CM

## 2024-11-27 LAB
GLUCOSE BLD-MCNC: 143 MG/DL (ref 70–110)
GLUCOSE BLD-MCNC: 163 MG/DL (ref 70–110)
GLUCOSE BLD-MCNC: 177 MG/DL (ref 70–110)
GLUCOSE BLD-MCNC: 226 MG/DL (ref 70–110)
GLUCOSE BLD-MCNC: 89 MG/DL (ref 70–110)

## 2024-11-27 PROCEDURE — 85025 COMPLETE CBC W/AUTO DIFF WBC: CPT

## 2024-11-27 PROCEDURE — 84132 ASSAY OF SERUM POTASSIUM: CPT

## 2024-11-27 PROCEDURE — 80048 BASIC METABOLIC PNL TOTAL CA: CPT

## 2024-11-27 PROCEDURE — 49595 RPR AA HRN 1ST > 10 RDC: CPT

## 2024-11-27 PROCEDURE — 64999 UNLISTED PX NERVOUS SYSTEM: CPT

## 2024-11-27 RX ADMIN — POTASSIUM CHLORIDE ONE MLS: 14.9 INJECTION, SOLUTION INTRAVENOUS at 06:38

## 2024-11-27 RX ADMIN — INSULIN DETEMIR SCH UNIT: 100 INJECTION, SOLUTION SUBCUTANEOUS at 21:02

## 2024-11-27 RX ADMIN — LIDOCAINE HYDROCHLORIDE AND EPINEPHRINE ONE ML: 10; 10 INJECTION, SOLUTION INFILTRATION; PERINEURAL at 09:00

## 2024-11-27 RX ADMIN — DEXAMETHASONE SODIUM PHOSPHATE ONE MG: 4 INJECTION, SOLUTION INTRAMUSCULAR; INTRAVENOUS at 06:48

## 2024-11-27 RX ADMIN — POTASSIUM CHLORIDE SCH MLS/HR: 14.9 INJECTION, SOLUTION INTRAVENOUS at 06:38

## 2024-11-27 RX ADMIN — ATORVASTATIN CALCIUM SCH MG: 80 TABLET, FILM COATED ORAL at 20:34

## 2024-11-27 RX ADMIN — SACUBITRIL AND VALSARTAN SCH EACH: 24; 26 TABLET, FILM COATED ORAL at 20:34

## 2024-11-27 RX ADMIN — POTASSIUM CHLORIDE ONE MLS: 14.9 INJECTION, SOLUTION INTRAVENOUS at 09:19

## 2024-11-27 RX ADMIN — KETOROLAC TROMETHAMINE SCH MG: 15 INJECTION, SOLUTION INTRAMUSCULAR; INTRAVENOUS at 12:42

## 2024-11-27 RX ADMIN — MIDAZOLAM ONE MG: 1 INJECTION INTRAMUSCULAR; INTRAVENOUS at 07:30

## 2024-11-27 RX ADMIN — POTASSIUM CHLORIDE SCH MLS/HR: 14.9 INJECTION, SOLUTION INTRAVENOUS at 12:43

## 2024-11-27 RX ADMIN — ONDANSETRON ONE MG: 2 INJECTION INTRAMUSCULAR; INTRAVENOUS at 06:48

## 2024-11-27 RX ADMIN — ACETAMINOPHEN STA MG: 500 TABLET ORAL at 06:48

## 2024-11-27 RX ADMIN — HEPARIN SODIUM STA UNIT: 5000 INJECTION, SOLUTION INTRAVENOUS; SUBCUTANEOUS at 07:41

## 2024-11-27 RX ADMIN — HYDROMORPHONE HYDROCHLORIDE PRN MG: 1 INJECTION, SOLUTION INTRAMUSCULAR; INTRAVENOUS; SUBCUTANEOUS at 20:34

## 2024-11-27 RX ADMIN — HYDROMORPHONE HYDROCHLORIDE PRN MG: 1 INJECTION, SOLUTION INTRAMUSCULAR; INTRAVENOUS; SUBCUTANEOUS at 11:08

## 2024-11-28 VITALS — DIASTOLIC BLOOD PRESSURE: 81 MMHG | TEMPERATURE: 97.6 F | HEART RATE: 89 BPM | SYSTOLIC BLOOD PRESSURE: 167 MMHG

## 2024-11-28 VITALS — RESPIRATION RATE: 16 BRPM

## 2024-11-28 LAB
ANION GAP SERPL CALC-SCNC: 8 MMOL/L
BASOPHILS # BLD AUTO: 0 K/UL (ref 0–0.2)
BASOPHILS NFR BLD AUTO: 0 %
BUN SERPL-SCNC: 41 MG/DL (ref 9–20)
CALCIUM SPEC-MCNC: 8.4 MG/DL (ref 8.4–10.2)
CHLORIDE SERPL-SCNC: 104 MMOL/L (ref 98–107)
CO2 SERPL-SCNC: 22 MMOL/L (ref 22–30)
EOSINOPHIL # BLD AUTO: 0.1 K/UL (ref 0–0.7)
EOSINOPHIL NFR BLD AUTO: 1 %
ERYTHROCYTE [DISTWIDTH] IN BLOOD BY AUTOMATED COUNT: 4.08 M/UL (ref 4.3–5.9)
ERYTHROCYTE [DISTWIDTH] IN BLOOD: 13.9 % (ref 11.5–15.5)
GLUCOSE BLD-MCNC: 161 MG/DL (ref 70–110)
GLUCOSE BLD-MCNC: 185 MG/DL (ref 70–110)
GLUCOSE SERPL-MCNC: 176 MG/DL (ref 74–99)
HCT VFR BLD AUTO: 39.5 % (ref 39–53)
HGB BLD-MCNC: 12.4 GM/DL (ref 13–17.5)
LYMPHOCYTES # SPEC AUTO: 1.6 K/UL (ref 1–4.8)
LYMPHOCYTES NFR SPEC AUTO: 14 %
MCH RBC QN AUTO: 30.5 PG (ref 25–35)
MCHC RBC AUTO-ENTMCNC: 31.5 G/DL (ref 31–37)
MCV RBC AUTO: 96.6 FL (ref 80–100)
MONOCYTES # BLD AUTO: 0.9 K/UL (ref 0–1)
MONOCYTES NFR BLD AUTO: 8 %
NEUTROPHILS # BLD AUTO: 8.7 K/UL (ref 1.3–7.7)
NEUTROPHILS NFR BLD AUTO: 76 %
PLATELET # BLD AUTO: 221 K/UL (ref 150–450)
POTASSIUM SERPL-SCNC: 4.5 MMOL/L (ref 3.5–5.1)
SODIUM SERPL-SCNC: 134 MMOL/L (ref 137–145)
WBC # BLD AUTO: 11.4 K/UL (ref 3.8–10.6)

## 2024-11-28 RX ADMIN — ENOXAPARIN SODIUM SCH MG: 40 INJECTION SUBCUTANEOUS at 08:52

## 2024-12-16 ENCOUNTER — HOSPITAL ENCOUNTER (EMERGENCY)
Dept: HOSPITAL 47 - EC | Age: 64
Discharge: HOME | End: 2024-12-16
Payer: COMMERCIAL

## 2024-12-16 VITALS
RESPIRATION RATE: 18 BRPM | TEMPERATURE: 97.7 F | HEART RATE: 82 BPM | SYSTOLIC BLOOD PRESSURE: 134 MMHG | DIASTOLIC BLOOD PRESSURE: 79 MMHG

## 2024-12-16 DIAGNOSIS — F17.200: ICD-10-CM

## 2024-12-16 DIAGNOSIS — E11.621: Primary | ICD-10-CM

## 2024-12-16 DIAGNOSIS — Z79.4: ICD-10-CM

## 2024-12-16 DIAGNOSIS — Z79.84: ICD-10-CM

## 2024-12-16 LAB
ALBUMIN SERPL-MCNC: 3.8 G/DL (ref 3.5–5)
ALP SERPL-CCNC: 92 U/L (ref 38–126)
ALT SERPL-CCNC: 14 U/L (ref 4–49)
ANION GAP SERPL CALC-SCNC: 6 MMOL/L
AST SERPL-CCNC: 21 U/L (ref 17–59)
BASOPHILS # BLD AUTO: 0 K/UL (ref 0–0.2)
BASOPHILS NFR BLD AUTO: 0 %
BUN SERPL-SCNC: 23 MG/DL (ref 9–20)
CALCIUM SPEC-MCNC: 9 MG/DL (ref 8.4–10.2)
CHLORIDE SERPL-SCNC: 107 MMOL/L (ref 98–107)
CO2 SERPL-SCNC: 24 MMOL/L (ref 22–30)
EOSINOPHIL # BLD AUTO: 0.5 K/UL (ref 0–0.7)
EOSINOPHIL NFR BLD AUTO: 5 %
ERYTHROCYTE [DISTWIDTH] IN BLOOD BY AUTOMATED COUNT: 4.15 M/UL (ref 4.3–5.9)
ERYTHROCYTE [DISTWIDTH] IN BLOOD: 13.6 % (ref 11.5–15.5)
GLUCOSE SERPL-MCNC: 85 MG/DL (ref 74–99)
HCT VFR BLD AUTO: 39.3 % (ref 39–53)
HGB BLD-MCNC: 12.8 GM/DL (ref 13–17.5)
LYMPHOCYTES # SPEC AUTO: 1.5 K/UL (ref 1–4.8)
LYMPHOCYTES NFR SPEC AUTO: 14 %
MCH RBC QN AUTO: 31 PG (ref 25–35)
MCHC RBC AUTO-ENTMCNC: 32.7 G/DL (ref 31–37)
MCV RBC AUTO: 94.8 FL (ref 80–100)
MONOCYTES # BLD AUTO: 0.9 K/UL (ref 0–1)
MONOCYTES NFR BLD AUTO: 9 %
NEUTROPHILS # BLD AUTO: 7.1 K/UL (ref 1.3–7.7)
NEUTROPHILS NFR BLD AUTO: 70 %
PLATELET # BLD AUTO: 274 K/UL (ref 150–450)
POTASSIUM SERPL-SCNC: 4.3 MMOL/L (ref 3.5–5.1)
PROT SERPL-MCNC: 6.5 G/DL (ref 6.3–8.2)
SODIUM SERPL-SCNC: 137 MMOL/L (ref 137–145)
WBC # BLD AUTO: 10.2 K/UL (ref 3.8–10.6)

## 2024-12-16 PROCEDURE — 96374 THER/PROPH/DIAG INJ IV PUSH: CPT

## 2024-12-16 PROCEDURE — 80053 COMPREHEN METABOLIC PANEL: CPT

## 2024-12-16 PROCEDURE — 73630 X-RAY EXAM OF FOOT: CPT

## 2024-12-16 PROCEDURE — 85025 COMPLETE CBC W/AUTO DIFF WBC: CPT

## 2024-12-16 PROCEDURE — 85652 RBC SED RATE AUTOMATED: CPT

## 2024-12-16 PROCEDURE — 36415 COLL VENOUS BLD VENIPUNCTURE: CPT

## 2024-12-16 PROCEDURE — 83605 ASSAY OF LACTIC ACID: CPT

## 2024-12-16 PROCEDURE — 86140 C-REACTIVE PROTEIN: CPT

## 2024-12-16 PROCEDURE — 99283 EMERGENCY DEPT VISIT LOW MDM: CPT

## 2024-12-16 RX ADMIN — CEFTRIAXONE SODIUM STA MG: 1 INJECTION, POWDER, FOR SOLUTION INTRAMUSCULAR; INTRAVENOUS at 12:02

## 2024-12-16 NOTE — XR
EXAMINATION TYPE: XR foot complete LT

 

DATE OF EXAM: 12/16/2024 10:27 AM

 

COMPARISON: None. 

 

CLINICAL INDICATION: Male, 63 years old with history of infection,pain, pain

 

TECHNIQUE: Frontal, lateral, and oblique images of the left foot are obtained.

 

FINDINGS: There is no evidence for fracture or dislocation. There appears to be soft tissue swelling 
about the great toe without erosive change seen of the osseous structures. No radiographic evidence t
o suggest osteomyelitis at this time. Vascular calcifications are present.

 

IMPRESSION: No evidence for acute fracture or dislocation. No evidence for osteomyelitis at this time
. Correlate clinically.

 

 

[facility], Workstation: [id number], 12/16/2024 10:30 AM

## 2024-12-16 NOTE — ED
Extremity Problem HPI





- General


Chief complaint: Extremity Problem,Nontraumatic


Stated complaint: Sore on L foot


Time Seen by Provider: 12/16/24 09:16


Source: patient, RN notes reviewed


Mode of arrival: ambulatory


Limitations: no limitations





- History of Present Illness


Initial comments: 


63-year-old male presents emergency department, left foot sore.  He states has 

been there for several months and started his initial blister he is known 

diabetic on insulin and oral medications.  Patient states it is increasing and 

does have a foul odor but he also noticed some skin changes on the top of his 

foot.  He has not discussed this with his primary care physician.  He states his

blood sugar has been up and down.








- Related Data


                                Home Medications











 Medication  Instructions  Recorded  Confirmed


 


Aspirin [Adult Low Dose Aspirin EC] 81 mg PO QAM 05/31/16 11/25/24


 


prednisoLONE ACETATE 1% OPHTH 1 drop RIGHT EYE AS DIRECTED 05/31/16 11/27/24





[Pred Forte 1%]   


 


Atorvastatin [Lipitor] 80 mg PO HS 07/10/17 11/27/24


 


calcitrioL 0.25 mcg PO AS DIRECTED 04/27/21 11/25/24


 


Empagliflozin [Jardiance] 10 mg PO DAILY 05/21/24 11/27/24


 


Insulin Glargine,Hum.rec.anlog 25 units INJ HS 05/21/24 11/27/24





[Lantus Solostar Pen]   


 


Sacubitril/Valsartan [Entresto 24 1 tab PO BID 05/21/24 11/25/24





mg-26 mg Tablet]   


 


allopurinoL 100 mg PO DAILY 05/21/24 11/25/24


 


glipiZIDE 5 mg PO DAILY 05/21/24 11/27/24








                                  Previous Rx's











 Medication  Instructions  Recorded


 


carvediloL [Coreg] 3.125 mg PO BID #180 tablet 09/24/24


 


Acetaminophen Tab [Tylenol] 650 mg PO Q6H #30 tab 11/27/24


 


Docusate [Colace] 100 mg PO BID #20 capsule 11/27/24


 


Ibuprofen [Motrin] 600 mg PO Q6HR PRN #40 tab 11/27/24


 


oxyCODONE HCL [OxyIR] 5 mg PO Q6H PRN 3 Days #10 tab 11/27/24


 


Cephalexin [Keflex] 500 mg PO Q6HR #40 cap 12/16/24


 


Mupirocin 2% Oint [Bactroban 2% 1 applic TOPICAL TID #22 gm 12/16/24





Oint]  











                                    Allergies











Allergy/AdvReac Type Severity Reaction Status Date / Time


 


No Known Allergies Allergy   Verified 12/16/24 09:15














Review of Systems


ROS Statement: 


Those systems with pertinent positive or pertinent negative responses have been 

documented in the HPI.





ROS Other: All systems not noted in ROS Statement are negative.





Past Medical History


Past Medical History: Coronary Artery Disease (CAD), Diabetes Mellitus, 

GERD/Reflux, Hyperlipidemia, Hypertension, Renal Disease


Additional Past Medical History / Comment(s): SEE DR CARSON'S H&P, Blockage

in L kidney. Hx. of detached retina.  kidney stone


History of Any Multi-Drug Resistant Organisms: None Reported


Past Surgical History: AICD, Appendectomy, Back Surgery, Cholecystectomy, 

Coronary Bypass/CABG, Heart Catheterization


Additional Past Surgical History / Comment(s): CABG X5,.  LEFT EYE RETINAL SX 

X2.  RT CATARACT SX glaucoma surgery


Past Anesthesia/Blood Transfusion Reactions: Postoperative Nausea & Vomiting 

(PONV)


Type of Cardiac Device: AICD


Device Placement Date:: 9/24/24-[employer]


Past Psychological History: No Psychological Hx Reported


Smoking Status: Current some day smoker


Past Alcohol Use History: None Reported


Past Drug Use History: None Reported





- Past Family History


  ** Father


Family Medical History: Cancer, Myocardial Infarction (MI)


Additional Family Medical History / Comment(s): PROSTATE





  ** Mother


Family Medical History: Cancer, Diabetes Mellitus


Additional Family Medical History / Comment(s): breast ca





General Exam


Limitations: no limitations


General appearance: alert, in no apparent distress


Head exam: Present: atraumatic, normocephalic, normal inspection


Neck exam: Present: normal inspection.  Absent: tenderness, meningismus, 

lymphadenopathy


Respiratory exam: Present: normal lung sounds bilaterally.  Absent: respiratory 

distress, wheezes, rales, rhonchi, stridor


Cardiovascular Exam: Present: regular rate, normal rhythm, normal heart sounds. 

Absent: systolic murmur, diastolic murmur, rubs, gallop, clicks


Extremities exam: Present: other (Foot and ball of foot just proximal to first 

digit there is a large callused area with deep ulceration and surrounding 

erythema on the dorsal and plantar surface)





Course


                                   Vital Signs











  12/16/24





  09:13


 


Temperature 98.3 F


 


Pulse Rate 97


 


Respiratory 20





Rate 


 


Blood Pressure 136/74


 


O2 Sat by Pulse 97





Oximetry 














Medical Decision Making





- Medical Decision Making


Was pt. sent in by a medical professional or institution (, PA, NP, urgent 

care, hospital, or nursing home...) When possible be specific


@ -No


Did you speak to anyone other than the patient for history (EMS, parent, family,

police, friend...)? What history was obtained from this source 


@ -No


Did you review nursing and triage notes (agree or disagree)? Why? 


@ -I reviewed and agree with nursing and triage notes


Were old charts reviewed (outside hosp., previous admission, EMS record, old 

EKG, old radiological studies, urgent care reports/EKG's, nursing home records)?

Report findings 


@ -No old charts were reviewed


Differential Diagnosis (chest pain, altered mental status, abdominal pain women,

abdominal pain men, vaginal bleeding, weakness, fever, dyspnea, syncope, 

headache, dizziness, GI bleed, back pain, seizure, CVA, palpatations, mental 

health, musculoskeletal)? 


@ -Diabetic foot ulceration, cellulitis, osteomyelitis


EKG interpreted by me (3pts min.).


@ -[None


X-rays interpreted by me (1pt min.).


@ -X-ray left foot evidence of osteomyelitis


CT interpreted by me (1pt min.).


@ -None done


U/S interpreted by me (1pt. min.).


@ -None done


What testing was considered but not performed or refused? (CT, X-rays, U/S, 

labs)? Why?


@ -None


What meds were considered but not given or refused? Why?


@ -None


Did you discuss the management of the patient with other professionals 

(professionals i.e. , PA, NP, lab, RT, psych nurse, , , 

teacher, , )? Give summary


@ -No


Was smoking cessation discussed for >3mins.?


@ -No


Was critical care preformed (if so, how long)?


@ -No


Were there social determinants of health that impacted care today? How? 

(Homelessness, low income, unemployed, alcoholism, drug addiction, 

transportation, low edu. Level, literacy, decrease access to med. care, group home, re

hab)?


@ -No


Was there de-escalation of care discussed even if they declined (Discuss DNR or 

withdrawal of care, Hospice)? DNR status


@ -No


What co-morbidities impacted this encounter? (DM, HTN, Smoking, COPD, CAD, 

Cancer, CVA, ARF, Chemo, Hep., AIDS, mental health diagnosis, sleep apnea, 

morbid obesity)?


@ -Diabetes


Was patient admitted / discharged? Hospital course, mention meds given and 

route, prescriptions, significant lab abnormalities, going to OR and other 

pertinent info.


@ -Discharge patient had a chronic wound with some cellulitic changes.  There is

 no evidence of osteomyelitis.  Patient will be discharged on oral antibiotics 

with follow-up with vascular and wound center given the chronicity of this 

wound.


Undiagnosed new problem with uncertain prognosis?


@ -No


Drug Therapy requiring intensive monitoring for toxicity (Heparin, Nitro, 

Insulin, Cardizem)?


@ -No


Were any procedures done?


@ -No


Diagnosis/symptom?


@ -Diabetic foot ulcer


Acute, or Chronic, or Acute on Chronic?


@ -Acute


Uncomplicated (without systemic symptoms) or Complicated (systemic symptoms)?


@ -Complicated


Side effects of treatment?


@ -No


Exacerbation, Progression, or Severe Exacerbation?


@ -No


Poses a threat to life or bodily function? How? (Chest pain, USA, MI, pneumonia,

 PE, COPD, DKA, ARF, appy, cholecystitis, CVA, Diverticulitis, Homicidal, 

Suicidal, threat to staff... and all critical care pts)


@ -Yes may lead to osteomyelitis if untreated causing sepsis and further 

complications








- Lab Data


Result diagrams: 


                                 12/16/24 10:00





                                 12/16/24 10:00


                                   Lab Results











  12/16/24 12/16/24 12/16/24 Range/Units





  10:00 10:00 10:00 


 


WBC  10.2    (3.8-10.6)  k/uL


 


RBC  4.15 L    (4.30-5.90)  m/uL


 


Hgb  12.8 L    (13.0-17.5)  gm/dL


 


Hct  39.3    (39.0-53.0)  %


 


MCV  94.8    (80.0-100.0)  fL


 


MCH  31.0    (25.0-35.0)  pg


 


MCHC  32.7    (31.0-37.0)  g/dL


 


RDW  13.6    (11.5-15.5)  %


 


Plt Count  274    (150-450)  k/uL


 


MPV  6.9    


 


Neutrophils %  70    %


 


Lymphocytes %  14    %


 


Monocytes %  9    %


 


Eosinophils %  5    %


 


Basophils %  0    %


 


Neutrophils #  7.1    (1.3-7.7)  k/uL


 


Lymphocytes #  1.5    (1.0-4.8)  k/uL


 


Monocytes #  0.9    (0-1.0)  k/uL


 


Eosinophils #  0.5    (0-0.7)  k/uL


 


Basophils #  0.0    (0-0.2)  k/uL


 


Sodium   137   (137-145)  mmol/L


 


Potassium   4.3   (3.5-5.1)  mmol/L


 


Chloride   107   ()  mmol/L


 


Carbon Dioxide   24   (22-30)  mmol/L


 


Anion Gap   6   mmol/L


 


BUN   23 H   (9-20)  mg/dL


 


Creatinine   1.74 H   (0.66-1.25)  mg/dL


 


Est GFR (CKD-EPI)AfAm   47   (>60 ml/min/1.73 sqM)  


 


Est GFR (CKD-EPI)NonAf   41   (>60 ml/min/1.73 sqM)  


 


Glucose   85   (74-99)  mg/dL


 


Plasma Lactic Acid Kevin    1.3  (0.7-2.0)  mmol/L


 


Calcium   9.0   (8.4-10.2)  mg/dL


 


Total Bilirubin   1.1   (0.2-1.3)  mg/dL


 


AST   21   (17-59)  U/L


 


ALT   14   (4-49)  U/L


 


Alkaline Phosphatase   92   ()  U/L


 


C-Reactive Protein   7.1 H   (<1.0)  mg/dL


 


Total Protein   6.5   (6.3-8.2)  g/dL


 


Albumin   3.8   (3.5-5.0)  g/dL














Disposition


Clinical Impression: 


 Diabetic foot infection





Disposition: HOME SELF-CARE


Condition: Stable


Instructions (If sedation given, give patient instructions):  Diabetic Foot 

Ulcers (ED)


Additional Instructions: 


Please return to the Emergency Department if symptoms worsen or any other 

concerns.


Prescriptions: 


Mupirocin 2% Oint [Bactroban 2% Oint] 1 applic TOPICAL TID #22 gm


Cephalexin [Keflex] 500 mg PO Q6HR #40 cap


Is patient prescribed a controlled substance at d/c from ED?: No


Referrals: 


[facility] [Primary Care Provider] - 1-2 days


Matheus Waters DO [STAFF PHYSICIAN] - 1-2 days


[facility] [NON-STAFF] - 1-2 days


Time of Disposition: 11:40

## 2025-01-20 ENCOUNTER — HOSPITAL ENCOUNTER (OUTPATIENT)
Dept: HOSPITAL 47 - RADNMMAIN | Age: 65
Discharge: HOME | End: 2025-01-20
Attending: PODIATRIST
Payer: COMMERCIAL

## 2025-01-20 DIAGNOSIS — L97.524: Primary | ICD-10-CM

## 2025-01-20 PROCEDURE — 78315 BONE IMAGING 3 PHASE: CPT

## 2025-01-20 NOTE — NM
EXAMINATION TYPE: NM bone 3 phase

 

DATE OF EXAM: 1/20/2025

 

COMPARISON: Left foot x-ray January 3, 2025

 

CLINICAL INDICATION: Male, 64 years old with history of L97.524 NON-PRS CHRONIC ULCER OTH PRT LEFT FO
OT;

 

Triple phase bone scintigraphy was performed following the injection of 24.2 mCi Tc 99m MDP.  Immedia
te images and 5 hours post injection images acquired. Imaging performed of the bilateral ankles and f
eet.

 

FINDINGS: Arterial and soft tissue phase images show increased uptake to the left ankle and foot vers
us the opposite right side. There is more prominent focal radiotracer uptake near the area of clinica
l concern first metatarsophalangeal joint in the left foot. Delayed phase imaging shows persistent ab
normal increased radiotracer uptake at this level.

 

 

 

IMPRESSION: Confirmation of three-phase increased radiotracer uptake at area of clinical concern near
 the first metatarsophalangeal joint. Acute osteomyelitis at this level has to be strongly considered
 given the presence of soft tissue ulceration .

 

 

 

[facility], Workstation: [id number], 1/20/2025 2:10 PM

## 2025-01-27 ENCOUNTER — HOSPITAL ENCOUNTER (OUTPATIENT)
Dept: HOSPITAL 47 - CATHCVL | Age: 65
End: 2025-01-27
Attending: INTERNAL MEDICINE
Payer: COMMERCIAL

## 2025-01-27 VITALS
DIASTOLIC BLOOD PRESSURE: 67 MMHG | SYSTOLIC BLOOD PRESSURE: 131 MMHG | HEART RATE: 93 BPM | TEMPERATURE: 98.4 F | RESPIRATION RATE: 14 BRPM

## 2025-01-27 DIAGNOSIS — Z79.899: ICD-10-CM

## 2025-01-27 DIAGNOSIS — I25.10: ICD-10-CM

## 2025-01-27 DIAGNOSIS — K21.9: ICD-10-CM

## 2025-01-27 DIAGNOSIS — E78.5: ICD-10-CM

## 2025-01-27 DIAGNOSIS — Z95.5: ICD-10-CM

## 2025-01-27 DIAGNOSIS — Z45.2: Primary | ICD-10-CM

## 2025-01-27 DIAGNOSIS — Z90.49: ICD-10-CM

## 2025-01-27 DIAGNOSIS — E11.9: ICD-10-CM

## 2025-01-27 DIAGNOSIS — M86.9: ICD-10-CM

## 2025-01-27 LAB — GLUCOSE BLD-MCNC: 181 MG/DL (ref 70–110)

## 2025-01-27 PROCEDURE — 36573 INSJ PICC RS&I 5 YR+: CPT

## 2025-01-27 RX ADMIN — AMPICILLIN SODIUM AND SULBACTAM SODIUM ONE MLS/HR: 2; 1 INJECTION, POWDER, FOR SOLUTION INTRAMUSCULAR; INTRAVENOUS at 13:00

## 2025-03-14 ENCOUNTER — HOSPITAL ENCOUNTER (OUTPATIENT)
Dept: HOSPITAL 47 - PROCWHC3 | Age: 65
Discharge: HOME | End: 2025-03-14
Attending: INTERNAL MEDICINE
Payer: COMMERCIAL

## 2025-03-14 VITALS
RESPIRATION RATE: 16 BRPM | HEART RATE: 83 BPM | DIASTOLIC BLOOD PRESSURE: 87 MMHG | SYSTOLIC BLOOD PRESSURE: 178 MMHG | TEMPERATURE: 98.3 F

## 2025-03-14 DIAGNOSIS — E11.621: Primary | ICD-10-CM

## 2025-03-14 DIAGNOSIS — L97.509: ICD-10-CM

## 2025-03-14 PROCEDURE — 99211 OFF/OP EST MAY X REQ PHY/QHP: CPT
